# Patient Record
Sex: FEMALE | Race: BLACK OR AFRICAN AMERICAN | Employment: OTHER | URBAN - METROPOLITAN AREA
[De-identification: names, ages, dates, MRNs, and addresses within clinical notes are randomized per-mention and may not be internally consistent; named-entity substitution may affect disease eponyms.]

---

## 2017-04-10 ENCOUNTER — OFFICE VISIT (OUTPATIENT)
Dept: INTERNAL MEDICINE CLINIC | Age: 67
End: 2017-04-10

## 2017-04-10 ENCOUNTER — HOSPITAL ENCOUNTER (OUTPATIENT)
Dept: MAMMOGRAPHY | Age: 67
Discharge: HOME | End: 2017-04-10
Attending: FAMILY MEDICINE
Payer: MEDICARE

## 2017-04-10 VITALS
TEMPERATURE: 95.9 F | SYSTOLIC BLOOD PRESSURE: 123 MMHG | HEART RATE: 63 BPM | RESPIRATION RATE: 16 BRPM | HEIGHT: 67 IN | WEIGHT: 153 LBS | DIASTOLIC BLOOD PRESSURE: 72 MMHG | OXYGEN SATURATION: 100 % | BODY MASS INDEX: 24.01 KG/M2

## 2017-04-10 DIAGNOSIS — I10 ESSENTIAL HYPERTENSION: Primary | ICD-10-CM

## 2017-04-10 DIAGNOSIS — Z12.31 VISIT FOR SCREENING MAMMOGRAM: ICD-10-CM

## 2017-04-10 PROCEDURE — 77067 SCR MAMMO BI INCL CAD: CPT

## 2017-04-10 RX ORDER — VALSARTAN AND HYDROCHLOROTHIAZIDE 80; 12.5 MG/1; MG/1
TABLET, FILM COATED ORAL
Qty: 90 TAB | Refills: 2 | Status: SHIPPED | OUTPATIENT
Start: 2017-04-10 | End: 2017-12-21 | Stop reason: SDUPTHER

## 2017-04-10 NOTE — PROGRESS NOTES
Subjective:     Chief Complaint   Patient presents with    Hypertension        She  is a 74y.o. year old female with h/o HTN who presents today for 6 month follow up on HTN. She reports that she has been living healthy lifestyle, very careful about what she eats, doing exercise regularly. She lost 12 pounds in 6 months. She says she feels great. No chest pain, soa, abdominal pain. Pertinent items are noted in HPI.   Objective:     Vitals:    04/10/17 0742   BP: 123/72   Pulse: 63   Resp: 16   Temp: 95.9 °F (35.5 °C)   TempSrc: Oral   SpO2: 100%   Weight: 153 lb (69.4 kg)   Height: 5' 6.5\" (1.689 m)       Physical Examination: General appearance - alert, well appearing, and in no distress, oriented to person, place, and time and normal appearing weight  Mental status - alert, oriented to person, place, and time, normal mood, behavior, speech, dress, motor activity, and thought processes  Chest - clear to auscultation, no wheezes, rales or rhonchi, symmetric air entry  Heart - normal rate, regular rhythm, normal S1, S2, no murmurs, rubs, clicks or gallops    No Known Allergies   Social History     Social History    Marital status: SINGLE     Spouse name: N/A    Number of children: N/A    Years of education: N/A     Social History Main Topics    Smoking status: Never Smoker    Smokeless tobacco: Never Used    Alcohol use No    Drug use: No    Sexual activity: No     Other Topics Concern    None     Social History Narrative      Family History   Problem Relation Age of Onset    Diabetes Mother     Hypertension Mother     Heart Disease Mother     Cancer Father     Hypertension Father     Diabetes Brother     Hypertension Brother     Diabetes Brother     Hypertension Brother     Hypertension Brother       Past Surgical History:   Procedure Laterality Date    HX HYSTERECTOMY  1980    HX ORTHOPAEDIC  2-2012    right index finger      Past Medical History:   Diagnosis Date    Hypertension     Prediabetes       Current Outpatient Prescriptions   Medication Sig Dispense Refill    valsartan-hydroCHLOROthiazide (DIOVAN-HCT) 80-12.5 mg per tablet TAKE 1 TABLET BY MOUTH DAILY  Indications: hypertension 90 Tab 2    cholecalciferol (VITAMIN D3) 1,000 unit cap Take  by mouth daily.  aspirin 81 mg chewable tablet Take 81 mg by mouth daily.  Biotin 2,500 mcg cap Take  by mouth.  multivitamin (ONE A DAY) tablet Take 1 Tab by mouth daily. Assessment/ Plan:   Aimee Morgan was seen today for hypertension. Diagnoses and all orders for this visit:    Essential hypertension  -   BP well controlled. Continue current med. valsartan-hydroCHLOROthiazide (DIOVAN-HCT) 80-12.5 mg per tablet; TAKE 1 TABLET BY MOUTH DAILY  Indications: hypertension           Medication risks/benefits/costs/interactions/alternatives discussed with patient. Advised patient to call back or return to office if symptoms worsen/change/persist. If patient cannot reach us or should anything more severe/urgent arise he/she should proceed directly to the nearest emergency department. Discussed expected course/resolution/complications of diagnosis in detail with patient. Patient given a written after visit summary which includes her diagnoses, current medications and vitals. Patient expressed understanding with the diagnosis and plan. Follow-up Disposition:  Return in about 4 months (around 8/10/2017) for medicare wellness and fasting lab. Tra Anderson

## 2017-04-10 NOTE — PROGRESS NOTES
1. Have you been to the ER, urgent care clinic since your last visit? Hospitalized since your last visit? No    2. Have you seen or consulted any other health care providers outside of the [address] [place] [address] since your last visit? Include any pap smears or colon screening.  No

## 2017-04-10 NOTE — MR AVS SNAPSHOT
Visit Information Date & Time Provider Department Dept. Phone Encounter #  
 4/10/2017  7:30 AM Musa Forde MD [facility] [phone] [id number] Follow-up Instructions Return in about 4 months (around 8/10/2017) for medicare wellness and fasting lab. Joey Grant Upcoming Health Maintenance Date Due DTaP/Tdap/Td series (1 - Tdap) 1/22/1973 BREAST CANCER SCRN MAMMOGRAM 3/28/2013 INFLUENZA AGE 9 TO ADULT 8/1/2016 GLAUCOMA SCREENING Q2Y 1/22/2017 Pneumococcal 65+ Low/Medium Risk (1 of 2 - PCV13) 1/22/2017 MEDICARE YEARLY EXAM 1/22/2017 COLONOSCOPY 3/20/2017 Allergies as of 4/10/2017  Review Complete On: 4/10/2017 By: [place] No Known Allergies Current Immunizations  Never Reviewed No immunizations on file. Not reviewed this visit You Were Diagnosed With   
  
 Codes Comments Essential hypertension    -  Primary ICD-10-CM: I10 
ICD-9-CM: 401.9 Vitals BP Pulse Temp Resp Height(growth percentile) Weight(growth percentile) 123/72 (BP 1 Location: Left arm, BP Patient Position: Sitting) 63 95.9 °F (35.5 °C) (Oral) 16 5' 6.5\" (1.689 m) 153 lb (69.4 kg) SpO2 BMI OB Status Smoking Status 100% 24.32 kg/m2 Hysterectomy Never Smoker Vitals History BMI and BSA Data Body Mass Index Body Surface Area  
 24.32 kg/m 2 1.8 m 2 Preferred Pharmacy Pharmacy Name Phone [id number] Debbie Rios[address] [phone] Your Updated Medication List  
  
   
This list is accurate as of: 4/10/17  8:05 AM.  Always use your most recent med list.  
  
  
  
  
 aspirin 81 mg chewable tablet Take 81 mg by mouth daily. Biotin 2,500 mcg Cap Take  by mouth.  
  
 multivitamin tablet Commonly known as:  ONE A DAY Take 1 Tab by mouth daily. valsartan-hydroCHLOROthiazide 80-12.5 mg per tablet Commonly known as:  DIOVAN-HCT  
 TAKE 1 TABLET BY MOUTH DAILY  Indications: hypertension VITAMIN D3 1,000 unit Cap Generic drug:  cholecalciferol Take  by mouth daily. Prescriptions Sent to Pharmacy Refills  
 valsartan-hydroCHLOROthiazide (DIOVAN-HCT) 80-12.5 mg per tablet 2 Sig: TAKE 1 TABLET BY MOUTH DAILY  Indications: hypertension Class: Normal  
 Pharmacy: [address] [place] [address] #: [phone] Follow-up Instructions Return in about 4 months (around 8/10/2017) for medicare wellness and fasting lab. Venus Pickens To-Do List   
 04/10/2017 10:30 AM  
(Arrive by 10:15 AM) Appointment with Kingsley MABRY 1 at [address] [place] [address] ([phone]) Shower or bathe using soap and water. Do not use deodorant, powder, perfumes, or lotion the day of your exam.  If your prior mammograms were not performed at [facility] please bring films with you or forward prior images 2 days before your procedure. Check in at registration 15min before your appointment time unless you were instructed to do otherwise. A script is not necessary, but if you have one, please bring it on the day of the mammogram or have it faxed to the department. [facility] [phone] [address] [place], [place] [id number] [facility] 19 THOMAS  [phone] [facility] [phone] [address] [phone] Please arrive 15 minutes prior to appointment to register. Introducing [facility]! Gabriel He introduces [employer] patient portal. Now you can access parts of your medical record, email your doctor's office, and request medication refills online. 1. In your internet browser, go to [email or web address] 2. Click on the First Time User? Click Here link in the Sign In box. You will see the New Member Sign Up page. 3. Enter your [employer] Access Code exactly as it appears below. You will not need to use this code after youve completed the sign-up process.  If you do not sign up before the expiration date, you must request a new code. · [employer] Access Code: [email or web address] Expires: 6/20/2017  3:42 PM 
 
4. Enter the last four digits of your Social Security Number (xxxx) and Date of Birth (mm/dd/yyyy) as indicated and click Submit. You will be taken to the next sign-up page. 5. Create a [employer] ID. This will be your [employer] login ID and cannot be changed, so think of one that is secure and easy to remember. 6. Create a [employer] password. You can change your password at any time. 7. Enter your Password Reset Question and Answer. This can be used at a later time if you forget your password. 8. Enter your e-mail address. You will receive e-mail notification when new information is available in [address] 9. Click Sign Up. You can now view and download portions of your medical record. 10. Click the Download Summary menu link to download a portable copy of your medical information. If you have questions, please visit the Frequently Asked Questions section of the [employer] website. Remember, [employer] is NOT to be used for urgent needs. For medical emergencies, dial 911. Now available from your iPhone and Android! Please provide this summary of care documentation to your next provider. Your primary care clinician is listed as Musa Dahl. If you have any questions after today's visit, please call [phone].

## 2017-10-30 ENCOUNTER — OFFICE VISIT (OUTPATIENT)
Dept: INTERNAL MEDICINE CLINIC | Age: 67
End: 2017-10-30

## 2017-10-30 VITALS
TEMPERATURE: 96.5 F | WEIGHT: 151.2 LBS | HEART RATE: 69 BPM | SYSTOLIC BLOOD PRESSURE: 138 MMHG | HEIGHT: 67 IN | RESPIRATION RATE: 18 BRPM | BODY MASS INDEX: 23.73 KG/M2 | DIASTOLIC BLOOD PRESSURE: 79 MMHG | OXYGEN SATURATION: 100 %

## 2017-10-30 DIAGNOSIS — Z78.0 POST-MENOPAUSAL: Primary | ICD-10-CM

## 2017-10-30 DIAGNOSIS — Z00.00 MEDICARE ANNUAL WELLNESS VISIT, SUBSEQUENT: ICD-10-CM

## 2017-10-30 DIAGNOSIS — I10 ESSENTIAL HYPERTENSION: ICD-10-CM

## 2017-10-30 DIAGNOSIS — Z23 ENCOUNTER FOR IMMUNIZATION: ICD-10-CM

## 2017-10-30 DIAGNOSIS — Z12.11 SCREEN FOR COLON CANCER: ICD-10-CM

## 2017-10-30 RX ORDER — ASCORBIC ACID 250 MG
TABLET ORAL
COMMUNITY

## 2017-10-30 NOTE — LETTER
11/3/2017 11:47 AM 
 
Ms. Heather Boucher [address] [facility] [phone] Dear Heather Boucher: 
 
Please find your most recent results below. Resulted Orders CBC WITH AUTOMATED DIFF Result Value Ref Range WBC 4.4 3.4 - 10.8 x10E3/uL  
 RBC 4.35 3.77 - 5.28 x10E6/uL HGB 12.6 11.1 - 15.9 g/dL HCT 38.3 34.0 - 46.6 % MCV 88 79 - 97 fL  
 MCH 29.0 26.6 - 33.0 pg  
 MCHC 32.9 31.5 - 35.7 g/dL  
 RDW 14.1 12.3 - 15.4 % PLATELET 748 931 - 540 x10E3/uL NEUTROPHILS 40 Not Estab. % Lymphocytes 48 Not Estab. % MONOCYTES 9 Not Estab. % EOSINOPHILS 2 Not Estab. % BASOPHILS 1 Not Estab. %  
 ABS. NEUTROPHILS 1.8 1.4 - 7.0 x10E3/uL Abs Lymphocytes 2.1 0.7 - 3.1 x10E3/uL  
 ABS. MONOCYTES 0.4 0.1 - 0.9 x10E3/uL  
 ABS. EOSINOPHILS 0.1 0.0 - 0.4 x10E3/uL  
 ABS. BASOPHILS 0.1 0.0 - 0.2 x10E3/uL IMMATURE GRANULOCYTES 0 Not Estab. %  
 ABS. IMM. GRANS. 0.0 0.0 - 0.1 x10E3/uL Narrative Performed at:  [address]  [id number] : Noel Swift MD, Phone:  [phone] METABOLIC PANEL, COMPREHENSIVE Result Value Ref Range Glucose 80 65 - 99 mg/dL BUN 18 8 - 27 mg/dL Creatinine 0.76 0.57 - 1.00 mg/dL GFR est non-AA 83 >59 mL/min/1.73 GFR est AA 95 >59 mL/min/1.73  
 BUN/Creatinine ratio 24 12 - 28 Sodium 138 134 - 144 mmol/L Potassium 4.2 3.5 - 5.2 mmol/L Chloride 99 96 - 106 mmol/L  
 CO2 25 18 - 29 mmol/L Calcium 9.3 8.7 - 10.3 mg/dL Protein, total 6.6 6.0 - 8.5 g/dL Albumin 4.0 3.6 - 4.8 g/dL GLOBULIN, TOTAL 2.6 1.5 - 4.5 g/dL A-G Ratio 1.5 1.2 - 2.2 Bilirubin, total 0.5 0.0 - 1.2 mg/dL Alk. phosphatase 41 39 - 117 IU/L  
 AST (SGOT) 14 0 - 40 IU/L  
 ALT (SGPT) 13 0 - 32 IU/L Narrative Performed at:  [address]  [id number] : Noel Swift MD, Phone:  [phone] LIPID PANEL Result Value Ref Range Cholesterol, total 191 100 - 199 mg/dL Triglyceride 66 0 - 149 mg/dL HDL Cholesterol 65 >39 mg/dL VLDL, calculated 13 5 - 40 mg/dL LDL, calculated 113 (H) 0 - 99 mg/dL Narrative Performed at:  [address]  [id number] : Kvng Mendes MD, Phone:  [phone] HEMOGLOBIN A1C WITH EAG Result Value Ref Range Hemoglobin A1c 5.6 4.8 - 5.6 % Comment:  
            Pre-diabetes: 5.7 - 6.4 Diabetes: >6.4 Glycemic control for adults with diabetes: <7.0 Estimated average glucose 114 mg/dL Narrative Performed at:  [address]  [id number] : Kvng Mendes MD, Phone:  [phone] HEPATITIS C AB Result Value Ref Range Hep C Virus Ab <0.1 0.0 - 0.9 s/co ratio Comment:  
                                     Negative:     < 0.8 Indeterminate: 0.8 - 0.9 Positive:     > 0.9 The CDC recommends that a positive HCV antibody result 
 be followed up with a HCV Nucleic Acid Amplification 
 test ([id number]). Narrative Performed at:  [address]  [id number] : Kvng Mendes MD, Phone:  [phone] CVD REPORT Result Value Ref Range INTERPRETATION Note Comment:  
   Supplemental report is available. Narrative Performed at:  [address] [place] [address]  [id number] : Lily Clinton PhD, Phone:  [phone] RECOMMENDATIONS: 
 
CBC,kidney, liver function is normal.  
 
Cholesterol level is slightly elevated, not concerning. No diabetes. Hep C is negative Please call me if you have any questions: [phone] Sincerely, 
 
 
Sage Gonzalez MD

## 2017-10-30 NOTE — MR AVS SNAPSHOT
Visit Information Date & Time Provider Department Dept. Phone Encounter #  
 10/30/2017  7:45 AM Kuldeep Grullon MD [facility] Internal Medicine [phone] [id number] Follow-up Instructions Return in about 6 months (around 4/30/2018). Upcoming Health Maintenance Date Due DTaP/Tdap/Td series (1 - Tdap) 1/22/1973 GLAUCOMA SCREENING Q2Y 1/22/2017 Pneumococcal 65+ Low/Medium Risk (1 of 2 - PCV13) 1/22/2017 MEDICARE YEARLY EXAM 1/22/2017 FOBT Q 1 YEAR, 18+ 8/30/2017 BREAST CANCER SCRN MAMMOGRAM 4/10/2018 COLONOSCOPY 10/30/2027 Allergies as of 10/30/2017  Review Complete On: 10/30/2017 By: Cleo Glaser MD  
 No Known Allergies Current Immunizations  Never Reviewed Name Date Pneumococcal Conjugate (PCV-13)  Incomplete Not reviewed this visit You Were Diagnosed With   
  
 Codes Comments Post-menopausal    -  Primary ICD-10-CM: Z78.0 ICD-9-CM: V49.81 Medicare annual wellness visit, subsequent     ICD-10-CM: Z00.00 ICD-9-CM: V70.0 Screen for colon cancer     ICD-10-CM: Z12.11 ICD-9-CM: V76.51 Encounter for immunization     ICD-10-CM: I89 ICD-9-CM: V03.89 Vitals BP Pulse Temp Resp Height(growth percentile) Weight(growth percentile) 138/79 (BP 1 Location: Left arm, BP Patient Position: Sitting) 69 96.5 °F (35.8 °C) (Oral) 18 5' 6.5\" (1.689 m) 151 lb 3.2 oz (68.6 kg) SpO2 BMI OB Status Smoking Status 100% 24.04 kg/m2 Hysterectomy Never Smoker Vitals History BMI and BSA Data Body Mass Index Body Surface Area 24.04 kg/m 2 1.79 m 2 Preferred Pharmacy Pharmacy Name Phone [id number] Jovana Boogie, [address] [place] [address] [phone] Your Updated Medication List  
  
   
This list is accurate as of: 10/30/17  8:21 AM.  Always use your most recent med list.  
  
  
  
  
 aspirin 81 mg chewable tablet Take 81 mg by mouth daily. Biotin 2,500 mcg Cap Take  by mouth.  
  
 multivitamin tablet Commonly known as:  ONE A DAY Take 1 Tab by mouth daily. valsartan-hydroCHLOROthiazide 80-12.5 mg per tablet Commonly known as:  DIOVAN-HCT  
TAKE 1 TABLET BY MOUTH DAILY  Indications: hypertension VITAMIN C 250 mg tablet Generic drug:  ascorbic acid (vitamin C) Take  by mouth. VITAMIN D3 1,000 unit Cap Generic drug:  cholecalciferol Take  by mouth daily. We Performed the Following ADMIN PNEUMOCOCCAL VACCINE [ [facility]] CBC WITH AUTOMATED DIFF [52952 CPT(R)] HEMOGLOBIN A1C WITH EAG [91316 CPT(R)] HEPATITIS C AB [69233 CPT(R)] LIPID PANEL [06428 CPT(R)] METABOLIC PANEL, COMPREHENSIVE [19882 CPT(R)] OCCULT BLOOD, IMMUNOASSAY (FIT) [id number] CPT(R)] PNEUMOCOCCAL CONJ VACCINE 13 VALENT IM [id number] CPT(R)] Follow-up Instructions Return in about 6 months (around 4/30/2018). To-Do List   
 11/02/2017 Imaging:  DEXA BONE DENSITY STUDY AXIAL Patient Instructions Medicare Wellness Visit, Female The best way to live healthy is to have a healthy lifestyle by eating a well-balanced diet, exercising regularly, limiting alcohol and stopping smoking. Regular physical exams and screening tests are another way to keep healthy. Preventive exams provided by your health care provider can find health problems before they become diseases or illnesses. Preventive services including immunizations, screening tests, monitoring and exams can help you take care of your own health. All people over age 72 should have a pneumovax  and and a prevnar shot to prevent pneumonia. These are once in a lifetime unless you and your provider decide differently. All people over 65 should have a yearly flu shot and a tetanus vaccine every 10 years. A bone mass density to screen for osteoporosis or thinning of the bones should be done every 2 years after 65. Screening for diabetes mellitus with a blood sugar test should be done every year. Glaucoma is a disease of the eye due to increased ocular pressure that can lead to blindness and it should be done every year by an eye professional. 
 
Cardiovascular screening tests that check for elevated lipids (fatty part of blood) which can lead to heart disease and strokes should be done every 5 years. Colorectal screening that evaluates for blood or polyps in your colon should be done yearly as a stool test or every five years as a flexible sigmoidoscope or every 10 years as a colonoscopy up to age 76. Breast cancer screening with a mammogram is recommended biennially  for women age 54-69. Screening for cervical cancer with a pap smear and pelvic exam is recommended for women after age 72 years every 2 years up to age 79 or when the provider and patient decide to stop. If there is a history of cervical abnormalities or other increased risk for cancer then the test is recommended yearly. Hepatitis C screening is also recommended for anyone born between 80 through [address]. A shingles vaccine is also recommended once in a lifetime after age 61. Your Medicare Wellness Exam is recommended annually. Here is a list of your current Health Maintenance items with a due date: 
Health Maintenance Due Topic Date Due  
 DTaP/Tdap/Td  (1 - Tdap) 01/22/1973  Glaucoma Screening   01/22/2017  Pneumococcal Vaccine (1 of 2 - PCV13) 01/22/2017 [address] [place] [address] Annual Well Visit  01/22/2017  Stool testing for trace blood  08/30/2017 Well Visit, Over 72: Care Instructions Your Care Instructions Physical exams can help you stay healthy. Your doctor has checked your overall health and may have suggested ways to take good care of yourself. He or she also may have recommended tests. At home, you can help prevent illness with healthy eating, regular exercise, and other steps. Follow-up care is a key part of your treatment and safety. Be sure to make and go to all appointments, and call your doctor if you are having problems. It's also a good idea to know your test results and keep a list of the medicines you take. How can you care for yourself at home? · Reach and stay at a healthy weight. This will lower your risk for many problems, such as obesity, diabetes, heart disease, and high blood pressure. · Get at least 30 minutes of exercise on most days of the week. Walking is a good choice. You also may want to do other activities, such as running, swimming, cycling, or playing tennis or team sports. · Do not smoke. Smoking can make health problems worse. If you need help quitting, talk to your doctor about stop-smoking programs and medicines. These can increase your chances of quitting for good. · Protect your skin from too much sun. When you're outdoors from 10 a.m. to 4 p.m., stay in the shade or cover up with clothing and a hat with a wide brim. Wear sunglasses that block UV rays. Even when it's cloudy, put broad-spectrum sunscreen (SPF 30 or higher) on any exposed skin. · See a dentist one or two times a year for checkups and to have your teeth cleaned. · Wear a seat belt in the car. · Limit alcohol to 2 drinks a day for men and 1 drink a day for women. Too much alcohol can cause health problems. Follow your doctor's advice about when to have certain tests. These tests can spot problems early. For men and women · Cholesterol. Your doctor will tell you how often to have this done based on your overall health and other things that can increase your risk for heart attack and stroke. · Blood pressure. Have your blood pressure checked during a routine doctor visit. Your doctor will tell you how often to check your blood pressure based on your age, your blood pressure results, and other factors. · Diabetes. Ask your doctor whether you should have tests for diabetes. · Vision. Experts recommend that you have yearly exams for glaucoma and other age-related eye problems. · Hearing. Tell your doctor if you notice any change in your hearing. You can have tests to find out how well you hear. · Colon cancer tests. Keep having colon cancer tests as your doctor recommends. You can have one of several types of tests. · Heart attack and stroke risk. At least every 4 to 6 years, you should have your risk for heart attack and stroke assessed. Your doctor uses factors such as your age, blood pressure, cholesterol, and whether you smoke or have diabetes to show what your risk for a heart attack or stroke is over the next 10 years. · Osteoporosis. Talk to your doctor about whether you should have a bone density test to find out whether you have thinning bones. Also ask your doctor about whether you should take calcium and vitamin D supplements. For women · Pap test and pelvic exam. You may no longer need a Pap test. Talk with your doctor about whether to stop or continue to have Pap tests. · Breast exam and mammogram. Ask how often you should have a mammogram, which is an X-ray of your breasts. A mammogram can spot breast cancer before it can be felt and when it is easiest to treat. · Thyroid disease. Talk to your doctor about whether to have your thyroid checked as part of a regular physical exam. Women have an increased chance of a thyroid problem. For men · Prostate exam. Talk to your doctor about whether you should have a blood test (called a PSA test) for prostate cancer. Experts disagree on whether men should have this test. Some experts recommend that you discuss the benefits and risks of the test with your doctor. · Abdominal aortic aneurysm. Ask your doctor whether you should have a test to check for an aneurysm. You may need a test if you ever smoked or if your parent, brother, sister, or child has had an aneurysm. When should you call for help? Watch closely for changes in your health, and be sure to contact your doctor if you have any problems or symptoms that concern you. Where can you learn more? Go to http://leonie-kiko.info/. Enter N090 in the search box to learn more about \"Well Visit, Over 65: Care Instructions. \" Current as of: May 12, 2017 Content Version: 11.4 © 5350-4910 Club [employer]cm. Care instructions adapted under license by [employer] (which disclaims liability or warranty for this information). If you have questions about a medical condition or this instruction, always ask your healthcare professional. [place] [address] any warranty or liability for your use of this information. Introducing [facility]! Pilar Lo introduces Syrmo patient portal. Now you can access parts of your medical record, email your doctor's office, and request medication refills online. 1. In your internet browser, go to [email or web address] 2. Click on the First Time User? Click Here link in the Sign In box. You will see the New Member Sign Up page. 3. Enter your Syrmo Access Code exactly as it appears below. You will not need to use this code after youve completed the sign-up process. If you do not sign up before the expiration date, you must request a new code. · Syrmo Access Code: [email or web address] Expires: 1/28/2018  8:21 AM 
 
4. Enter the last four digits of your Social Security Number (xxxx) and Date of Birth (mm/dd/yyyy) as indicated and click Submit. You will be taken to the next sign-up page. 5. Create a Seeliot ID. This will be your Syrmo login ID and cannot be changed, so think of one that is secure and easy to remember. 6. Create a Syrmo password. You can change your password at any time. 7. Enter your Password Reset Question and Answer. This can be used at a later time if you forget your password. 8. Enter your e-mail address. You will receive e-mail notification when new information is available in [address] 9. Click Sign Up. You can now view and download portions of your medical record. 10. Click the Download Summary menu link to download a portable copy of your medical information. If you have questions, please visit the Frequently Asked Questions section of the Trapster website. Remember, Trapster is NOT to be used for urgent needs. For medical emergencies, dial 911. Now available from your iPhone and Android! Please provide this summary of care documentation to your next provider. Your primary care clinician is listed as Musa A Hesham. If you have any questions after today's visit, please call [phone].

## 2017-10-30 NOTE — LETTER
11/3/2017 11:37 AM 
 
Ms. Ran Noel [address] [facility] [phone] Dear Ran Noel: 
 
Please find your most recent results below. Resulted Orders OCCULT BLOOD, IMMUNOASSAY (FIT) Result Value Ref Range Occult blood fecal, by [place] CANCELED Comment:  
   Result canceled by the ancillary Narrative Performed at:  [address]  [id number] : Jyoti Anthony MD, Phone:  [phone] SPECIMEN STATUS REPORT Result Value Ref Range SPECIMEN STATUS REPORT COMMENT Comment:  
   No Test Indicated A lavender top tube was received with no test indicated A serum separator tube was received with no test indicated Dear Doctor, The requisition we received for the above patient has no test 
indicated on the request form for one or more of the specimens 
submitted. The [place] Code of [employer] requires a 
written and signed request be forwarded to the testing laboratory 
following the verbal order of a laboratory test. 
Date:_________________________________ ICD-9/10 Diagnosis Code(s):___________________________________________ Physician or Authorized Designee Signature: 
______________________________________________________________________ Your signature confirms your order of the test(s) listed Required test name(s):________________________________________________ Required test number(s):______________________________________________ Please provide requested information and fax to [phone] 
to expedite testing. Narrative Performed at:  [address]  [id number] : Jyoti Anthony MD, Phone:  [phone] CBC WITH AUTOMATED DIFF Result Value Ref Range WBC 4.4 3.4 - 10.8 x10E3/uL  
 RBC 4.35 3.77 - 5.28 x10E6/uL HGB 12.6 11.1 - 15.9 g/dL HCT 38.3 34.0 - 46.6 %  MCV 88 79 - 97 fL  
 MCH 29.0 26.6 - 33.0 pg  
 MCHC 32.9 31.5 - 35.7 g/dL  
 RDW 14.1 12.3 - 15.4 % PLATELET 887 604 - 213 x10E3/uL NEUTROPHILS 40 Not Estab. % Lymphocytes 48 Not Estab. % MONOCYTES 9 Not Estab. % EOSINOPHILS 2 Not Estab. % BASOPHILS 1 Not Estab. %  
 ABS. NEUTROPHILS 1.8 1.4 - 7.0 x10E3/uL Abs Lymphocytes 2.1 0.7 - 3.1 x10E3/uL  
 ABS. MONOCYTES 0.4 0.1 - 0.9 x10E3/uL  
 ABS. EOSINOPHILS 0.1 0.0 - 0.4 x10E3/uL  
 ABS. BASOPHILS 0.1 0.0 - 0.2 x10E3/uL IMMATURE GRANULOCYTES 0 Not Estab. %  
 ABS. IMM. GRANS. 0.0 0.0 - 0.1 x10E3/uL Narrative Performed at:  [address]  [id number] : Tip Johnson MD, Phone:  [phone] METABOLIC PANEL, COMPREHENSIVE Result Value Ref Range Glucose 80 65 - 99 mg/dL BUN 18 8 - 27 mg/dL Creatinine 0.76 0.57 - 1.00 mg/dL GFR est non-AA 83 >59 mL/min/1.73 GFR est AA 95 >59 mL/min/1.73  
 BUN/Creatinine ratio 24 12 - 28 Sodium 138 134 - 144 mmol/L Potassium 4.2 3.5 - 5.2 mmol/L Chloride 99 96 - 106 mmol/L  
 CO2 25 18 - 29 mmol/L Calcium 9.3 8.7 - 10.3 mg/dL Protein, total 6.6 6.0 - 8.5 g/dL Albumin 4.0 3.6 - 4.8 g/dL GLOBULIN, TOTAL 2.6 1.5 - 4.5 g/dL A-G Ratio 1.5 1.2 - 2.2 Bilirubin, total 0.5 0.0 - 1.2 mg/dL Alk. phosphatase 41 39 - 117 IU/L  
 AST (SGOT) 14 0 - 40 IU/L  
 ALT (SGPT) 13 0 - 32 IU/L Narrative Performed at:  [address]  [id number] : Tip Johnson MD, Phone:  [phone] LIPID PANEL Result Value Ref Range Cholesterol, total 191 100 - 199 mg/dL Triglyceride 66 0 - 149 mg/dL HDL Cholesterol 65 >39 mg/dL VLDL, calculated 13 5 - 40 mg/dL LDL, calculated 113 (H) 0 - 99 mg/dL Narrative Performed at:  [address]  [id number] : Tip Johnson MD, Phone:  [phone] HEMOGLOBIN A1C WITH EAG Result Value Ref Range Hemoglobin A1c 5.6 4.8 - 5.6 % Comment:  
            Pre-diabetes: 5.7 - 6.4 Diabetes: >6.4 Glycemic control for adults with diabetes: <7.0 Estimated average glucose 114 mg/dL Narrative Performed at:  [address]  [id number] : Kika Ballesteros MD, Phone:  [phone] HEPATITIS C AB Result Value Ref Range Hep C Virus Ab <0.1 0.0 - 0.9 s/co ratio Comment:  
                                     Negative:     < 0.8 Indeterminate: 0.8 - 0.9 Positive:     > 0.9 The CDC recommends that a positive HCV antibody result 
 be followed up with a HCV Nucleic Acid Amplification 
 test (308176). Narrative Performed at:  [address]  [id number] : Kika Ballesteros MD, Phone:  [phone] CVD REPORT Result Value Ref Range INTERPRETATION Note Comment:  
   Supplemental report is available. Narrative Performed at:  [address] [place] [address]  [id number] : Umberto Jolly PhD, Phone:  [phone] RECOMMENDATIONS: 
 
CBC,kidney, liver function is normal.  
 
Cholesterol level is slightly elevated, not concerning. No diabetes. Hep C is negative. Please call me if you have any questions: [phone] Sincerely, 
 
 
Cleo Glaser MD

## 2017-10-30 NOTE — PROGRESS NOTES
This is a Subsequent Medicare Annual Wellness Exam (AWV) (Performed 12 months after IPPE or effective date of Medicare Part B enrollment)    I have reviewed the patient's medical history in detail and updated the computerized patient record. BP has been well controlled with Diovan. Active, participates in [employer] regularly. Last A1c was 6.2. Denies any chest pain, cough, abdominal pain, diarrhea, bloody stool. History     Past Medical History:   Diagnosis Date    Hypertension     Prediabetes       Past Surgical History:   Procedure Laterality Date    HX HYSTERECTOMY  38    HX ORTHOPAEDIC  2-2012    right index finger     Current Outpatient Prescriptions   Medication Sig Dispense Refill    ascorbic acid, vitamin C, (VITAMIN C) 250 mg tablet Take  by mouth.  valsartan-hydroCHLOROthiazide (DIOVAN-HCT) 80-12.5 mg per tablet TAKE 1 TABLET BY MOUTH DAILY  Indications: hypertension 90 Tab 2    cholecalciferol (VITAMIN D3) 1,000 unit cap Take  by mouth daily.  aspirin 81 mg chewable tablet Take 81 mg by mouth daily.  Biotin 2,500 mcg cap Take  by mouth.  multivitamin (ONE A DAY) tablet Take 1 Tab by mouth daily.        No Known Allergies  Family History   Problem Relation Age of Onset    Diabetes Mother     Hypertension Mother     Heart Disease Mother     Cancer Father     Hypertension Father     Diabetes Brother     Hypertension Brother     Diabetes Brother     Hypertension Brother     Hypertension Brother      Social History   Substance Use Topics    Smoking status: Never Smoker    Smokeless tobacco: Never Used    Alcohol use No     Patient Active Problem List   Diagnosis Code    Essential hypertension I10    Post-menopausal Z78.0    Prediabetes R73.03    Presbyopia H52.4       Depression Risk Factor Screening:     PHQ over the last two weeks 10/30/2017   Little interest or pleasure in doing things Not at all   Feeling down, depressed or hopeless Not at all Total Score PHQ 2 0     Alcohol Risk Factor Screening: You do not drink alcohol or very rarely. Functional Ability and Level of Safety:   Hearing Loss  Hearing is good. Activities of Daily Living  The home contains: handrails, grab bars and rugs  Patient does total self care    Fall RiskFall Risk Assessment, last 12 mths 10/30/2017   Able to walk? Yes   Fall in past 12 months? No       Abuse Screen  Patient is not abused  lives with son. Cognitive Screening   Evaluation of Cognitive Function:  Has your family/caregiver stated any concerns about your memory: no  Normal     Physical Examination: General appearance - alert, well appearing, and in no distress, oriented to person, place, and time and overweight  Mental status - alert, oriented to person, place, and time, normal mood, behavior, speech, dress, motor activity, and thought processes  Chest - clear to auscultation, no wheezes, rales or rhonchi, symmetric air entry  Heart - normal rate, regular rhythm, normal S1, S2, no murmurs, rubs, clicks or gallops  Abd: S/NT/ND  Neurological - alert, oriented, normal speech, no focal findings or movement disorder noted        Patient Care Team   Patient Care Team:  Soren Erickson MD as PCP - General (Family Practice)    Assessment/Plan   Education and counseling provided:  Are appropriate based on today's review and evaluation  Pneumococcal Vaccine  Influenza Vaccine  Screening Mammography  Colorectal cancer screening tests  Bone mass measurement (DEXA)  Screening for glaucoma    Diagnoses and all orders for this visit:    1. Post-menopausal  -     Dexa Bone Density Study Axial ([id number]); Future    2. Medicare annual wellness visit, subsequent  -     OCCULT BLOOD, IMMUNOASSAY (FIT) (28119)  -     HEPATITIS C AB  -     Dexa Bone Density Study Axial ([id number]);  Future  -     Pneumococcal Admin ()  -     Pneumococcal Conjugate Vaccine  -     CBC WITH AUTOMATED DIFF  -     METABOLIC PANEL, COMPREHENSIVE  - LIPID PANEL  -     HEMOGLOBIN A1C WITH EAG    3. Screen for colon cancer  -     OCCULT BLOOD, IMMUNOASSAY (FIT) ([id number])    4. Encounter for immunization  -     Pneumococcal Admin ()  -     Pneumococcal Conjugate Vaccine    5. Essential hypertension        -     BP well controlled. Continue Diovan.       Health Maintenance Due   Topic Date Due    GLAUCOMA SCREENING Q2Y  01/22/2017    FOBT Q 1 YEAR, 18+  08/30/2017

## 2017-10-30 NOTE — PATIENT INSTRUCTIONS
Medicare Wellness Visit, Female    The best way to live healthy is to have a healthy lifestyle by eating a well-balanced diet, exercising regularly, limiting alcohol and stopping smoking. Regular physical exams and screening tests are another way to keep healthy. Preventive exams provided by your health care provider can find health problems before they become diseases or illnesses. Preventive services including immunizations, screening tests, monitoring and exams can help you take care of your own health. All people over age 72 should have a pneumovax  and and a prevnar shot to prevent pneumonia. These are once in a lifetime unless you and your provider decide differently. All people over 65 should have a yearly flu shot and a tetanus vaccine every 10 years. A bone mass density to screen for osteoporosis or thinning of the bones should be done every 2 years after 65. Screening for diabetes mellitus with a blood sugar test should be done every year. Glaucoma is a disease of the eye due to increased ocular pressure that can lead to blindness and it should be done every year by an eye professional.    Cardiovascular screening tests that check for elevated lipids (fatty part of blood) which can lead to heart disease and strokes should be done every 5 years. Colorectal screening that evaluates for blood or polyps in your colon should be done yearly as a stool test or every five years as a flexible sigmoidoscope or every 10 years as a colonoscopy up to age 76. Breast cancer screening with a mammogram is recommended biennially  for women age 54-69. Screening for cervical cancer with a pap smear and pelvic exam is recommended for women after age 72 years every 2 years up to age 79 or when the provider and patient decide to stop. If there is a history of cervical abnormalities or other increased risk for cancer then the test is recommended yearly.     Hepatitis C screening is also recommended for anyone born between [birth year] [facility]. A shingles vaccine is also recommended once in a lifetime after age 61. Your Medicare Wellness Exam is recommended annually. Here is a list of your current Health Maintenance items with a due date:  Health Maintenance Due   Topic Date Due    DTaP/Tdap/Td  (1 - Tdap) 01/22/1973    Glaucoma Screening   01/22/2017    Pneumococcal Vaccine (1 of 2 - PCV13) 01/22/2017    Annual Well Visit  01/22/2017    Stool testing for trace blood  08/30/2017          Well Visit, Over 72: Care Instructions  Your Care Instructions    Physical exams can help you stay healthy. Your doctor has checked your overall health and may have suggested ways to take good care of yourself. He or she also may have recommended tests. At home, you can help prevent illness with healthy eating, regular exercise, and other steps. Follow-up care is a key part of your treatment and safety. Be sure to make and go to all appointments, and call your doctor if you are having problems. It's also a good idea to know your test results and keep a list of the medicines you take. How can you care for yourself at home? · Reach and stay at a healthy weight. This will lower your risk for many problems, such as obesity, diabetes, heart disease, and high blood pressure. · Get at least 30 minutes of exercise on most days of the week. Walking is a good choice. You also may want to do other activities, such as running, swimming, cycling, or playing tennis or team sports. · Do not smoke. Smoking can make health problems worse. If you need help quitting, talk to your doctor about stop-smoking programs and medicines. These can increase your chances of quitting for good. · Protect your skin from too much sun. When you're outdoors from 10 a.m. to 4 p.m., stay in the shade or cover up with clothing and a hat with a wide brim. Wear sunglasses that block UV rays.  Even when it's cloudy, put broad-spectrum sunscreen (SPF 30 or higher) on any exposed skin. · See a dentist one or two times a year for checkups and to have your teeth cleaned. · Wear a seat belt in the car. · Limit alcohol to 2 drinks a day for men and 1 drink a day for women. Too much alcohol can cause health problems. Follow your doctor's advice about when to have certain tests. These tests can spot problems early. For men and women  · Cholesterol. Your doctor will tell you how often to have this done based on your overall health and other things that can increase your risk for heart attack and stroke. · Blood pressure. Have your blood pressure checked during a routine doctor visit. Your doctor will tell you how often to check your blood pressure based on your age, your blood pressure results, and other factors. · Diabetes. Ask your doctor whether you should have tests for diabetes. · Vision. Experts recommend that you have yearly exams for glaucoma and other age-related eye problems. · Hearing. Tell your doctor if you notice any change in your hearing. You can have tests to find out how well you hear. · Colon cancer tests. Keep having colon cancer tests as your doctor recommends. You can have one of several types of tests. · Heart attack and stroke risk. At least every 4 to 6 years, you should have your risk for heart attack and stroke assessed. Your doctor uses factors such as your age, blood pressure, cholesterol, and whether you smoke or have diabetes to show what your risk for a heart attack or stroke is over the next 10 years. · Osteoporosis. Talk to your doctor about whether you should have a bone density test to find out whether you have thinning bones. Also ask your doctor about whether you should take calcium and vitamin D supplements. For women  · Pap test and pelvic exam. You may no longer need a Pap test. Talk with your doctor about whether to stop or continue to have Pap tests.   · Breast exam and mammogram. Ask how often you should have a mammogram, which is an X-ray of your breasts. A mammogram can spot breast cancer before it can be felt and when it is easiest to treat. · Thyroid disease. Talk to your doctor about whether to have your thyroid checked as part of a regular physical exam. Women have an increased chance of a thyroid problem. For men  · Prostate exam. Talk to your doctor about whether you should have a blood test (called a PSA test) for prostate cancer. Experts disagree on whether men should have this test. Some experts recommend that you discuss the benefits and risks of the test with your doctor. · Abdominal aortic aneurysm. Ask your doctor whether you should have a test to check for an aneurysm. You may need a test if you ever smoked or if your parent, brother, sister, or child has had an aneurysm. When should you call for help? Watch closely for changes in your health, and be sure to contact your doctor if you have any problems or symptoms that concern you. Where can you learn more? Go to http://leonie-kiko.info/. Enter A867 in the search box to learn more about \"Well Visit, Over 65: Care Instructions. \"  Current as of: May 12, 2017  Content Version: 11.4  © 0637-7443 Healthwise, [employer]. Care instructions adapted under license by [employer] (which disclaims liability or warranty for this information). If you have questions about a medical condition or this instruction, always ask your healthcare professional. [place] [address] any warranty or liability for your use of this information.

## 2017-11-01 LAB — SPECIMEN STATUS REPORT, ROLRST: NORMAL

## 2017-11-02 LAB
ALBUMIN SERPL-MCNC: 4 G/DL (ref 3.6–4.8)
ALBUMIN/GLOB SERPL: 1.5 {RATIO} (ref 1.2–2.2)
ALP SERPL-CCNC: 41 IU/L (ref 39–117)
ALT SERPL-CCNC: 13 IU/L (ref 0–32)
AST SERPL-CCNC: 14 IU/L (ref 0–40)
BASOPHILS # BLD AUTO: 0.1 X10E3/UL (ref 0–0.2)
BASOPHILS NFR BLD AUTO: 1 %
BILIRUB SERPL-MCNC: 0.5 MG/DL (ref 0–1.2)
BUN SERPL-MCNC: 18 MG/DL (ref 8–27)
BUN/CREAT SERPL: 24 (ref 12–28)
CALCIUM SERPL-MCNC: 9.3 MG/DL (ref 8.7–10.3)
CHLORIDE SERPL-SCNC: 99 MMOL/L (ref 96–106)
CHOLEST SERPL-MCNC: 191 MG/DL (ref 100–199)
CO2 SERPL-SCNC: 25 MMOL/L (ref 18–29)
CREAT SERPL-MCNC: 0.76 MG/DL (ref 0.57–1)
EOSINOPHIL # BLD AUTO: 0.1 X10E3/UL (ref 0–0.4)
EOSINOPHIL NFR BLD AUTO: 2 %
ERYTHROCYTE [DISTWIDTH] IN BLOOD BY AUTOMATED COUNT: 14.1 % (ref 12.3–15.4)
EST. AVERAGE GLUCOSE BLD GHB EST-MCNC: 114 MG/DL
GFR SERPLBLD CREATININE-BSD FMLA CKD-EPI: 83 ML/MIN/1.73
GFR SERPLBLD CREATININE-BSD FMLA CKD-EPI: 95 ML/MIN/1.73
GLOBULIN SER CALC-MCNC: 2.6 G/DL (ref 1.5–4.5)
GLUCOSE SERPL-MCNC: 80 MG/DL (ref 65–99)
HBA1C MFR BLD: 5.6 % (ref 4.8–5.6)
HCT VFR BLD AUTO: 38.3 % (ref 34–46.6)
HCV AB S/CO SERPL IA: <0.1 S/CO RATIO (ref 0–0.9)
HDLC SERPL-MCNC: 65 MG/DL
HEMOCCULT STL QL IA: NORMAL
HGB BLD-MCNC: 12.6 G/DL (ref 11.1–15.9)
IMM GRANULOCYTES # BLD: 0 X10E3/UL (ref 0–0.1)
IMM GRANULOCYTES NFR BLD: 0 %
INTERPRETATION, 910389: NORMAL
LDLC SERPL CALC-MCNC: 113 MG/DL (ref 0–99)
LYMPHOCYTES # BLD AUTO: 2.1 X10E3/UL (ref 0.7–3.1)
LYMPHOCYTES NFR BLD AUTO: 48 %
MCH RBC QN AUTO: 29 PG (ref 26.6–33)
MCHC RBC AUTO-ENTMCNC: 32.9 G/DL (ref 31.5–35.7)
MCV RBC AUTO: 88 FL (ref 79–97)
MONOCYTES # BLD AUTO: 0.4 X10E3/UL (ref 0.1–0.9)
MONOCYTES NFR BLD AUTO: 9 %
NEUTROPHILS # BLD AUTO: 1.8 X10E3/UL (ref 1.4–7)
NEUTROPHILS NFR BLD AUTO: 40 %
PLATELET # BLD AUTO: 218 X10E3/UL (ref 150–379)
POTASSIUM SERPL-SCNC: 4.2 MMOL/L (ref 3.5–5.2)
PROT SERPL-MCNC: 6.6 G/DL (ref 6–8.5)
RBC # BLD AUTO: 4.35 X10E6/UL (ref 3.77–5.28)
SODIUM SERPL-SCNC: 138 MMOL/L (ref 134–144)
SPECIMEN STATUS REPORT, ROLRST: NORMAL
TRIGL SERPL-MCNC: 66 MG/DL (ref 0–149)
VLDLC SERPL CALC-MCNC: 13 MG/DL (ref 5–40)
WBC # BLD AUTO: 4.4 X10E3/UL (ref 3.4–10.8)

## 2017-11-03 ENCOUNTER — TELEPHONE (OUTPATIENT)
Dept: INTERNAL MEDICINE CLINIC | Age: 67
End: 2017-11-03

## 2017-11-03 NOTE — TELEPHONE ENCOUNTER
----- Message from Perfecto Jain MD sent at 11/3/2017 11:33 AM EDT -----  Please call her:    CBC,kidney, liver function is normal.    Cholesterol level is slightly elevated, not concerning. No diabetes. Hep C is negative.

## 2017-11-03 NOTE — PROGRESS NOTES
Please call her:    CBC,kidney, liver function is normal.    Cholesterol level is slightly elevated, not concerning. No diabetes. Hep C is negative.

## 2017-11-20 ENCOUNTER — HOSPITAL ENCOUNTER (OUTPATIENT)
Dept: BONE DENSITY | Age: 67
Discharge: HOME | End: 2017-11-20
Attending: FAMILY MEDICINE
Payer: MEDICARE

## 2017-11-20 DIAGNOSIS — Z78.0 POST-MENOPAUSAL: ICD-10-CM

## 2017-11-20 DIAGNOSIS — Z00.00 MEDICARE ANNUAL WELLNESS VISIT, SUBSEQUENT: ICD-10-CM

## 2017-11-20 PROCEDURE — 77080 DXA BONE DENSITY AXIAL: CPT

## 2017-11-20 NOTE — LETTER
11/21/2017 10:58 AM 
 
Ms. Freedom Parra [address] [place] [id number] Dear Freedom Parra: 
 
Please find your most recent results below. Resulted Orders DEXA BONE DENSITY STUDY AXIAL Narrative Bone Mineral Density Indication:  Screening Age: 74 Sex: Female. Menopause status: postmenopausal. 
Hormone replacement therapy: None Number of falls in the past year:   None. Risk factors for osteoporosis:  None. Current medication for osteoporosis: Calcium and vitamin D. Comparison: None. Technique: Imaging was performed on the ACTON. World Health Organization 
meta-analysis fracture risk calculator (FRAX) analysis was performed for 10 year 
fracture risk probability assessment Excluded sites: 0 Findings: 
  
 
Femoral Neck:  Left Bone mineral density (gm/cm2):? 1.165 
% of peak bone mass: 112 
% for age matched controls:? 80 T-score: 0.9 Z-score: 1.4 Total Hip: Left Bone mineral density (gm/cm2):  1.196 
% of peak bone mass:   119 
% for age matched controls:  80 T-score:   1.5 
Z-score:  1.7 Lumbar Spine:  L1-4 Bone mineral density (gm/cm2):  1.463 
% of peak bone mass:  122  
% for age matched controls:  80 T-score:  2.2 Z-score:  3.0 T score the distal one third left radius 0.4 Impression Impression: This patient is normal using the World Health Organization criteria 10 year probability of major osteoporotic fracture:  2.6% 10 year probability of hip fracture:  0% Recommendations: 
Therapy recommendations need to be tailored to each individual patient. Using 
the [facility]) FRAX absolute fracture algorithm, the 
[address] [place] [address] recommends beginning pharmacological therapy in 
postmenopausal women and men over the age of 48 with a 8 year probability of a 
hip fracture of >3% OR with the 10 year probability of a major osteoporotic 
fracture of >20%. Please reconsider testing based on risk factors. Currently, Medicare will only 
reimburse for a central DXA examination every two years, unless the patient is 
on chronic glucocorticoid therapy. Note: Please note that reliable, valid comparisons cannot be made between 
studies which have been performed on machines from different manufacturers. If 
clinically warranted, a follow up study performed at this site, on the same 
unit, would allow the most sensitive assessment of change in bone mineral 
density. RECOMMENDATIONS: 
 
Bone density exam is normal.  
 
Please call me if you have any questions: [phone] Sincerely, THOMAS DEXA 1

## 2017-12-21 DIAGNOSIS — I10 ESSENTIAL HYPERTENSION: ICD-10-CM

## 2017-12-21 RX ORDER — VALSARTAN AND HYDROCHLOROTHIAZIDE 80; 12.5 MG/1; MG/1
TABLET, FILM COATED ORAL
Qty: 90 TAB | Refills: 2 | Status: SHIPPED | OUTPATIENT
Start: 2017-12-21 | End: 2018-08-06

## 2018-08-06 ENCOUNTER — TELEPHONE (OUTPATIENT)
Dept: INTERNAL MEDICINE CLINIC | Age: 68
End: 2018-08-06

## 2018-08-06 DIAGNOSIS — I10 ESSENTIAL HYPERTENSION: Primary | ICD-10-CM

## 2018-08-06 RX ORDER — LOSARTAN POTASSIUM AND HYDROCHLOROTHIAZIDE 12.5; 5 MG/1; MG/1
1 TABLET ORAL DAILY
Qty: 30 TAB | Refills: 1 | Status: SHIPPED | OUTPATIENT
Start: 2018-08-06 | End: 2018-09-05 | Stop reason: SDUPTHER

## 2018-08-06 NOTE — TELEPHONE ENCOUNTER
Spoke with patient regarding recall of Valsartan and Valsartan/HCTZ. Asked that patient contact pharmacy to determine if prescription was filled with the recalled lots. Discussed that Dr. Maci De La Cruz will be switching to a different medication within the same class. Advised that patient continue to take Valsartan in the meantime, as the risk of harm may be higher if treatment is stopped immediately without any alternative treatment. Advised to follow-up in 6 weeks for BP check.        Patient last seen: 10/30/17, /79

## 2018-08-07 ENCOUNTER — TELEPHONE (OUTPATIENT)
Dept: INTERNAL MEDICINE CLINIC | Age: 68
End: 2018-08-07

## 2018-08-07 NOTE — TELEPHONE ENCOUNTER
Patient called to discuss Valsartan recall further. States that when she went to the pharmacy yesterday to  new prescription of Losartan-HCTZ that the pharmacist informed her that the valsartan that she was prescribed from them was not in the contaminated lot. That particular pharmacy does not use any of the implicated brands. They told her to contact us before they provided the Losartan-HCTZ to see if she needs to switch or if she can remain on the [address] that she has been taking.

## 2018-08-07 NOTE — TELEPHONE ENCOUNTER
Please call patient:    Refer to last note:   If she willing to continue the Valsartan she can do that   She can just let us know her decision that way we can up date her med list.

## 2018-08-16 ENCOUNTER — TELEPHONE (OUTPATIENT)
Dept: INTERNAL MEDICINE CLINIC | Age: 68
End: 2018-08-16

## 2018-08-30 ENCOUNTER — TELEPHONE (OUTPATIENT)
Dept: INTERNAL MEDICINE CLINIC | Age: 68
End: 2018-08-30

## 2018-08-30 RX ORDER — LOSARTAN POTASSIUM AND HYDROCHLOROTHIAZIDE 12.5; 5 MG/1; MG/1
1 TABLET ORAL DAILY
Qty: 30 TAB | Refills: 1 | OUTPATIENT
Start: 2018-08-30

## 2018-08-30 NOTE — TELEPHONE ENCOUNTER
LVM for pt to return my call. I spoke with the pharmacist at [facility] and she stated that there is a refill there but it is too soon to fill it. She cannot fill it until 9/4.

## 2018-08-30 NOTE — TELEPHONE ENCOUNTER
Pt needs a refill on blood pressure medication, pharmacy is stating there were no addition refills with the prescription and this needs to be refilled.

## 2018-08-31 NOTE — TELEPHONE ENCOUNTER
Informed pt about refill issues, she understood. Just wanted to make sure the 90 day supply was sent to pharmacy so when she goes in on the 4th it will be filled correctly.

## 2018-09-05 RX ORDER — LOSARTAN POTASSIUM AND HYDROCHLOROTHIAZIDE 12.5; 5 MG/1; MG/1
1 TABLET ORAL DAILY
Qty: 90 TAB | Refills: 0 | Status: SHIPPED | OUTPATIENT
Start: 2018-09-05 | End: 2018-09-26 | Stop reason: SDUPTHER

## 2018-09-25 RX ORDER — VALSARTAN AND HYDROCHLOROTHIAZIDE 80; 12.5 MG/1; MG/1
1 TABLET, FILM COATED ORAL DAILY
Qty: 90 TAB | Refills: 0 | Status: SHIPPED | OUTPATIENT
Start: 2018-09-25 | End: 2018-09-26

## 2018-09-26 RX ORDER — LOSARTAN POTASSIUM AND HYDROCHLOROTHIAZIDE 12.5; 5 MG/1; MG/1
1 TABLET ORAL DAILY
Qty: 90 TAB | Refills: 0 | Status: SHIPPED | OUTPATIENT
Start: 2018-09-26 | End: 2018-11-12 | Stop reason: SDUPTHER

## 2018-09-26 NOTE — TELEPHONE ENCOUNTER
Pt stated she needs losartan sent to pharmacy. Valsartan was sent instead and she was told not to continue on this med. Please send new script in for 90 day supply of losartan for pt.     Last ov: 10/30/17   Last refill: 9/5/18  Last lab: 11/1/17  Pharmacy: [facility]    90 day supply

## 2018-09-28 ENCOUNTER — TELEPHONE (OUTPATIENT)
Dept: PRIMARY CARE CLINIC | Age: 68
End: 2018-09-28

## 2018-09-28 NOTE — TELEPHONE ENCOUNTER
appt October 9 at [facility] for mammogram. Requesting that an order be placed for her to have a chest xray while she is there. Please call to advise. Does she need an appt with Dr Maci De La Cruz to get this order placed? Can she just send the order?     [phone]

## 2018-10-09 ENCOUNTER — HOSPITAL ENCOUNTER (OUTPATIENT)
Dept: MAMMOGRAPHY | Age: 68
Discharge: HOME | End: 2018-10-09
Attending: FAMILY MEDICINE
Payer: MEDICARE

## 2018-10-09 DIAGNOSIS — Z12.31 VISIT FOR SCREENING MAMMOGRAM: ICD-10-CM

## 2018-10-09 PROCEDURE — 77067 SCR MAMMO BI INCL CAD: CPT

## 2018-11-12 ENCOUNTER — OFFICE VISIT (OUTPATIENT)
Dept: PRIMARY CARE CLINIC | Age: 68
End: 2018-11-12

## 2018-11-12 VITALS
HEIGHT: 67 IN | HEART RATE: 63 BPM | BODY MASS INDEX: 24.8 KG/M2 | WEIGHT: 158 LBS | DIASTOLIC BLOOD PRESSURE: 83 MMHG | TEMPERATURE: 98.2 F | OXYGEN SATURATION: 96 % | RESPIRATION RATE: 16 BRPM | SYSTOLIC BLOOD PRESSURE: 126 MMHG

## 2018-11-12 DIAGNOSIS — Z12.11 SCREEN FOR COLON CANCER: ICD-10-CM

## 2018-11-12 DIAGNOSIS — Z00.00 MEDICARE ANNUAL WELLNESS VISIT, SUBSEQUENT: Primary | ICD-10-CM

## 2018-11-12 DIAGNOSIS — I10 ESSENTIAL HYPERTENSION: ICD-10-CM

## 2018-11-12 DIAGNOSIS — Z23 ENCOUNTER FOR IMMUNIZATION: ICD-10-CM

## 2018-11-12 DIAGNOSIS — Z13.31 SCREENING FOR DEPRESSION: ICD-10-CM

## 2018-11-12 DIAGNOSIS — Z78.9 ADVANCE DIRECTIVE IN CHART: ICD-10-CM

## 2018-11-12 DIAGNOSIS — Z13.39 SCREENING FOR ALCOHOLISM: ICD-10-CM

## 2018-11-12 RX ORDER — VITAMIN E CAP 100 UNIT 100 UNIT
CAP ORAL DAILY
COMMUNITY

## 2018-11-12 RX ORDER — LOSARTAN POTASSIUM AND HYDROCHLOROTHIAZIDE 12.5; 5 MG/1; MG/1
1 TABLET ORAL DAILY
Qty: 90 TAB | Refills: 1 | Status: SHIPPED | OUTPATIENT
Start: 2018-11-12 | End: 2019-03-29 | Stop reason: RX

## 2018-11-12 NOTE — PROGRESS NOTES
Chief Complaint Patient presents with  Complete Physical  
  fasting 1. Have you been to the ER, urgent care clinic since your last visit? Hospitalized since your last visit? No 
 
2. Have you seen or consulted any other health care providers outside of the [address] [place] [address] since your last visit? Include any pap smears or colon screening.  No

## 2018-11-12 NOTE — PROGRESS NOTES
This is the Subsequent Medicare Annual Wellness Exam, performed 12 months or more after the Initial AWV or the last Subsequent AWV I have reviewed the patient's medical history in detail and updated the computerized patient record. 79 y.o. female with h/o HTN is here  for annual medical exam as well as follow up on chronic condition. BP has been well controlled with Hyzaar. Need refill. Exercises regularly. Patent reports that she has been doing well. All ROS is negative. History Past Medical History:  
Diagnosis Date  Hypertension  Prediabetes Past Surgical History:  
Procedure Laterality Date [address]  HX ORTHOPAEDIC  2-2012  
 right index finger Current Outpatient Medications Medication Sig Dispense Refill  vitamin e (E GEMS) 100 unit capsule Take  by mouth daily.  losartan-hydroCHLOROthiazide (HYZAAR) 50-12.5 mg per tablet Take 1 Tab by mouth daily. 90 Tab 0  
 ascorbic acid, vitamin C, (VITAMIN C) 250 mg tablet Take  by mouth.  cholecalciferol (VITAMIN D3) 1,000 unit cap Take  by mouth daily.  aspirin 81 mg chewable tablet Take 81 mg by mouth daily.  Biotin 2,500 mcg cap Take  by mouth.  multivitamin (ONE A DAY) tablet Take 1 Tab by mouth daily. No Known Allergies Family History Problem Relation Age of Onset  Diabetes Mother  Hypertension Mother  Heart Disease Mother  Cancer Father  Hypertension Father  Diabetes Brother  Hypertension Brother  Diabetes Brother  Hypertension Brother  Hypertension Brother Social History Tobacco Use  Smoking status: Never Smoker  Smokeless tobacco: Never Used Substance Use Topics  Alcohol use: No  
 
Patient Active Problem List  
Diagnosis Code  Essential hypertension I10  
 Post-menopausal Z78.0  Prediabetes R73.03  
 Presbyopia H52.4 Depression Risk Factor Screening: PHQ over the last two weeks 10/30/2017 Little interest or pleasure in doing things Not at all Feeling down, depressed, irritable, or hopeless Not at all Total Score PHQ 2 0 Alcohol Risk Factor Screening: You do not drink alcohol or very rarely. Functional Ability and Level of Safety:  
Hearing Loss Hearing is good. Activities of Daily Living The home contains: handrails, grab bars and rugs Patient does total self care Fall Risk Fall Risk Assessment, last 12 mths 10/30/2017 Able to walk? Yes Fall in past 12 months? No  
 
 
Abuse Screen Patient is not abused Gen: no acute distress HEENT: mucous membranes moist 
Thyroid: no enlargement or nodules noted CAD: normal rate, regular rhythm. No murmur rubs or gallops PULM: clear to ausculation, no wheezes, rhonchis or rales. GI: soft, nontender, nondistended Neuro: grossly non focal 
Psych: pleasant, cooperative, good insight into medical hx Skin: warm and dry Ext: no edema,  
 
 
Cognitive Screening Evaluation of Cognitive Function: 
Has your family/caregiver stated any concerns about your memory: no 
Normal 
 
Patient Care Team  
Patient Care Team: 
Florina Arboleda MD as PCP - General (Family Practice) Assessment/Plan Education and counseling provided: 
Are appropriate based on today's review and evaluation Pneumococcal Vaccine Influenza Vaccine Screening Mammography Screening Pap and pelvic (covered once every 2 years) Colorectal cancer screening tests Bone mass measurement (DEXA) Screening for glaucoma Diabetes screening test 
 
Diagnoses and all orders for this visit: 
 
1. Medicare annual wellness visit, subsequent -     RI ANNUAL ALCOHOL SCREEN 15 MIN 
-     DEPRESSION SCREEN ANNUAL 
-     ADMIN PNEUMOCOCCAL VACCINE 
-     PNEUMOCOCCAL POLYSACCHARIDE VACCINE, 23-VALENT, ADULT OR IMMUNOSUPPRESSED PT DOSE, 
-     CBC WITH AUTOMATED DIFF 
-     METABOLIC PANEL, COMPREHENSIVE 
-     LIPID PANEL 
 -     HEMOGLOBIN A1C WITH EAG 2. Essential hypertension -     METABOLIC PANEL, COMPREHENSIVE 
-   BP well controlled with   losartan-hydroCHLOROthiazide (HYZAAR) 50-12.5 mg per tablet; Take 1 Tab by mouth daily. 3. Screening for alcoholism -     [place] ANNUAL ALCOHOL SCREEN 15 MIN 4. Screening for depression 
-     [facility]  No depression. 5. Encounter for immunization -     ADMIN PNEUMOCOCCAL VACCINE 
-     PNEUMOCOCCAL POLYSACCHARIDE VACCINE, 23-VALENT, ADULT OR IMMUNOSUPPRESSED PT DOSE, 6. Screen for colon cancer 
-     REFERRAL FOR COLONOSCOPY 7. Advance directive in chart Health Maintenance Due Topic Date Due  Shingrix Vaccine Age 50> (1 of 2) 01/22/2002  GLAUCOMA SCREENING Q2Y  01/22/2017  
 FOBT Q 1 YEAR, 18+  10/30/2018

## 2018-11-13 LAB
ALBUMIN SERPL-MCNC: 4.1 G/DL (ref 3.6–4.8)
ALBUMIN/GLOB SERPL: 1.6 {RATIO} (ref 1.2–2.2)
ALP SERPL-CCNC: 46 IU/L (ref 39–117)
ALT SERPL-CCNC: 15 IU/L (ref 0–32)
AST SERPL-CCNC: 20 IU/L (ref 0–40)
BASOPHILS # BLD AUTO: 0.1 X10E3/UL (ref 0–0.2)
BASOPHILS NFR BLD AUTO: 1 %
BILIRUB SERPL-MCNC: 0.4 MG/DL (ref 0–1.2)
BUN SERPL-MCNC: 14 MG/DL (ref 8–27)
BUN/CREAT SERPL: 15 (ref 12–28)
CALCIUM SERPL-MCNC: 9.1 MG/DL (ref 8.7–10.3)
CHLORIDE SERPL-SCNC: 101 MMOL/L (ref 96–106)
CHOLEST SERPL-MCNC: 190 MG/DL (ref 100–199)
CO2 SERPL-SCNC: 25 MMOL/L (ref 20–29)
CREAT SERPL-MCNC: 0.91 MG/DL (ref 0.57–1)
EOSINOPHIL # BLD AUTO: 0.2 X10E3/UL (ref 0–0.4)
EOSINOPHIL NFR BLD AUTO: 3 %
ERYTHROCYTE [DISTWIDTH] IN BLOOD BY AUTOMATED COUNT: 14.4 % (ref 12.3–15.4)
EST. AVERAGE GLUCOSE BLD GHB EST-MCNC: 114 MG/DL
GLOBULIN SER CALC-MCNC: 2.6 G/DL (ref 1.5–4.5)
GLUCOSE SERPL-MCNC: 82 MG/DL (ref 65–99)
HBA1C MFR BLD: 5.6 % (ref 4.8–5.6)
HCT VFR BLD AUTO: 37.9 % (ref 34–46.6)
HDLC SERPL-MCNC: 65 MG/DL
HGB BLD-MCNC: 12.1 G/DL (ref 11.1–15.9)
IMM GRANULOCYTES # BLD: 0 X10E3/UL (ref 0–0.1)
IMM GRANULOCYTES NFR BLD: 0 %
LDLC SERPL CALC-MCNC: 113 MG/DL (ref 0–99)
LYMPHOCYTES # BLD AUTO: 2.2 X10E3/UL (ref 0.7–3.1)
LYMPHOCYTES NFR BLD AUTO: 48 %
MCH RBC QN AUTO: 28.5 PG (ref 26.6–33)
MCHC RBC AUTO-ENTMCNC: 31.9 G/DL (ref 31.5–35.7)
MCV RBC AUTO: 89 FL (ref 79–97)
MONOCYTES # BLD AUTO: 0.4 X10E3/UL (ref 0.1–0.9)
MONOCYTES NFR BLD AUTO: 9 %
NEUTROPHILS # BLD AUTO: 1.8 X10E3/UL (ref 1.4–7)
NEUTROPHILS NFR BLD AUTO: 39 %
PLATELET # BLD AUTO: 228 X10E3/UL (ref 150–379)
POTASSIUM SERPL-SCNC: 4 MMOL/L (ref 3.5–5.2)
PROT SERPL-MCNC: 6.7 G/DL (ref 6–8.5)
RBC # BLD AUTO: 4.25 X10E6/UL (ref 3.77–5.28)
SODIUM SERPL-SCNC: 139 MMOL/L (ref 134–144)
TRIGL SERPL-MCNC: 58 MG/DL (ref 0–149)
VLDLC SERPL CALC-MCNC: 12 MG/DL (ref 5–40)
WBC # BLD AUTO: 4.6 X10E3/UL (ref 3.4–10.8)

## 2018-11-15 NOTE — PROGRESS NOTES
Please mail letter: 1. LDL level is slightly above normal range and stable compare to last times. Continue work on [email or web address] and exercise. 2. CBC, kidney, liver function is normal.  
 
3. No diabetes.

## 2019-02-01 ENCOUNTER — TELEPHONE (OUTPATIENT)
Dept: PRIMARY CARE CLINIC | Age: 69
End: 2019-02-01

## 2019-02-01 NOTE — TELEPHONE ENCOUNTER
Dr Dahl/Telephone   Received: [facility]             Pt is reporting that she was recommended to have colonoscopy, with Dr Marycruz Garcia at [facility], she has contacted the doctor, however, no one has returned a call to schedule. Also, needs information regarding her shingles shot.      Best contact [phone]

## 2019-03-29 ENCOUNTER — TELEPHONE (OUTPATIENT)
Dept: PRIMARY CARE CLINIC | Age: 69
End: 2019-03-29

## 2019-03-29 DIAGNOSIS — I10 ESSENTIAL HYPERTENSION: Primary | ICD-10-CM

## 2019-03-29 RX ORDER — OLMESARTAN MEDOXOMIL AND HYDROCHLOROTHIAZIDE 20/12.5 20; 12.5 MG/1; MG/1
1 TABLET ORAL DAILY
Qty: 30 TAB | Refills: 0 | Status: SHIPPED | OUTPATIENT
Start: 2019-03-29 | End: 2019-04-20 | Stop reason: SDUPTHER

## 2019-03-29 NOTE — TELEPHONE ENCOUNTER
Prescription in stock and covered by pt's insurance. LVM notifying patient to monitor BP and notify office is any changes with new BP medication.

## 2019-03-29 NOTE — TELEPHONE ENCOUNTER
We can try Olmesartan-HCTZ. Will send over to pharmacy. She should monitor BP at home and follow-up with any signficant change in BP with new med.

## 2019-03-29 NOTE — TELEPHONE ENCOUNTER
----- Message from Felix Mancia sent at 3/28/2019  8:09 PM EDT -----  Regarding: Dr. Meera Hyman / Rx refill  Contact: [phone]  Pt requested a return call. Pt said the [facility] Pharmacist stated the Rx Losartan 50 mgs is not available in any of the [facility] pharmacies or other pharmacies. Pt is leaving to go out [place] on [place]. Pharmacy has made 4 attempts

## 2019-04-20 DIAGNOSIS — I10 ESSENTIAL HYPERTENSION: ICD-10-CM

## 2019-04-21 RX ORDER — OLMESARTAN MEDOXOMIL AND HYDROCHLOROTHIAZIDE 20/12.5 20; 12.5 MG/1; MG/1
TABLET ORAL
Qty: 30 TAB | Refills: 0 | Status: SHIPPED | OUTPATIENT
Start: 2019-04-21 | End: 2019-05-09 | Stop reason: SDUPTHER

## 2019-05-09 ENCOUNTER — OFFICE VISIT (OUTPATIENT)
Dept: PRIMARY CARE CLINIC | Age: 69
End: 2019-05-09

## 2019-05-09 VITALS
HEIGHT: 67 IN | TEMPERATURE: 98.5 F | SYSTOLIC BLOOD PRESSURE: 127 MMHG | DIASTOLIC BLOOD PRESSURE: 76 MMHG | WEIGHT: 160 LBS | RESPIRATION RATE: 17 BRPM | BODY MASS INDEX: 25.11 KG/M2 | HEART RATE: 63 BPM | OXYGEN SATURATION: 98 %

## 2019-05-09 DIAGNOSIS — I10 ESSENTIAL HYPERTENSION: Primary | ICD-10-CM

## 2019-05-09 DIAGNOSIS — Z23 NEED FOR TDAP VACCINATION: ICD-10-CM

## 2019-05-09 DIAGNOSIS — Z71.84 TRAVEL ADVICE ENCOUNTER: ICD-10-CM

## 2019-05-09 RX ORDER — OLMESARTAN MEDOXOMIL AND HYDROCHLOROTHIAZIDE 20/12.5 20; 12.5 MG/1; MG/1
TABLET ORAL
Qty: 90 TAB | Refills: 1 | Status: SHIPPED | OUTPATIENT
Start: 2019-05-09 | End: 2019-10-23 | Stop reason: SDUPTHER

## 2019-05-09 NOTE — PROGRESS NOTES
Subjective:     Chief Complaint   Patient presents with    Immunization/Injection     tdap    Other     6 month f/u        She  is a 81y.o. year old female who presents today for 6 months follow up as well as get Tdap vaccination for her up coming mission trip. HTN: BP has been well controlled with Benicar. No side effect. Patient reports that she will be going to [facility] this year. Need Tdap vaccine. She will get Typhoid vaccine in [facility]. Otherwise she has been doing well. Denies any chest pain, soa, cough. Pertinent items are noted in HPI.   Objective:     Vitals:    05/09/19 1143   BP: 127/76   Pulse: 63   Resp: 17   Temp: 98.5 °F (36.9 °C)   TempSrc: Oral   SpO2: 98%   Weight: 160 lb (72.6 kg)   Height: 5' 6.5\" (1.689 m)       Physical Examination: General appearance - alert, well appearing, and in no distress, oriented to person, place, and time and overweight  Mental status - alert, oriented to person, place, and time, normal mood, behavior, speech, dress, motor activity, and thought processes  Chest - clear to auscultation, no wheezes, rales or rhonchi, symmetric air entry  Heart - normal rate, regular rhythm, normal S1, S2, no murmurs, rubs, clicks or gallops    No Known Allergies   Social History     Socioeconomic History    Marital status: SINGLE     Spouse name: Not on file    Number of children: Not on file    Years of education: Not on file    Highest education level: Not on file   Tobacco Use    Smoking status: Never Smoker    Smokeless tobacco: Never Used   Substance and Sexual Activity    Alcohol use: No    Drug use: No    Sexual activity: Never      Family History   Problem Relation Age of Onset    Diabetes Mother     Hypertension Mother     Heart Disease Mother     Cancer Father     Hypertension Father     Diabetes Brother     Hypertension Brother     Diabetes Brother     Hypertension Brother     Hypertension Brother       Past Surgical History:   Procedure Laterality Date    HX HYSTERECTOMY  1980    HX ORTHOPAEDIC  2-2012    right index finger      Past Medical History:   Diagnosis Date    Hypertension     Prediabetes       Current Outpatient Medications   Medication Sig Dispense Refill    olmesartan-hydroCHLOROthiazide (BENICAR HCT) 20-12.5 mg per tablet TAKE 1 TABLET BY MOUTH EVERY DAY 30 Tab 0    vitamin e (E GEMS) 100 unit capsule Take  by mouth daily.  ascorbic acid, vitamin C, (VITAMIN C) 250 mg tablet Take  by mouth.  cholecalciferol (VITAMIN D3) 1,000 unit cap Take  by mouth daily.  aspirin 81 mg chewable tablet Take 81 mg by mouth daily.  Biotin 2,500 mcg cap Take  by mouth.  multivitamin (ONE A DAY) tablet Take 1 Tab by mouth daily. Assessment/ Plan:   Diagnoses and all orders for this visit:    1. Essential hypertension  -   Well controlled with  olmesartan-hydroCHLOROthiazide (BENICAR HCT) 20-12.5 mg per tablet; TAKE 1 TABLET BY MOUTH EVERY DAY    2. Need for Tdap vaccination  -     TETANUS, DIPHTHERIA TOXOIDS AND ACELLULAR PERTUSSIS VACCINE (TDAP), IN INDIVIDS. >=7, IM           Medication risks/benefits/costs/interactions/alternatives discussed with patient. Advised patient to call back or return to office if symptoms worsen/change/persist. If patient cannot reach us or should anything more severe/urgent arise he/she should proceed directly to the nearest emergency department. Discussed expected course/resolution/complications of diagnosis in detail with patient. Patient given a written after visit summary which includes her diagnoses, current medications and vitals. Patient expressed understanding with the diagnosis and plan. Follow-up and Dispositions    · Return in about 6 months (around 11/18/2019) for medicare well ness. Edna Callejas

## 2019-09-24 ENCOUNTER — TELEPHONE (OUTPATIENT)
Dept: PRIMARY CARE CLINIC | Age: 69
End: 2019-09-24

## 2019-09-24 NOTE — TELEPHONE ENCOUNTER
Patient called to find out if olmesartan is the bp medicine that has been recalled    She would like a return call

## 2019-11-22 DIAGNOSIS — I10 ESSENTIAL HYPERTENSION: ICD-10-CM

## 2019-11-22 RX ORDER — OLMESARTAN MEDOXOMIL AND HYDROCHLOROTHIAZIDE 20/12.5 20; 12.5 MG/1; MG/1
TABLET ORAL
Qty: 30 TAB | Refills: 2 | Status: SHIPPED | OUTPATIENT
Start: 2019-11-22 | End: 2020-01-24

## 2019-12-31 ENCOUNTER — HOSPITAL ENCOUNTER (OUTPATIENT)
Dept: MAMMOGRAPHY | Age: 69
Discharge: HOME | End: 2019-12-31
Attending: FAMILY MEDICINE
Payer: MEDICARE

## 2019-12-31 DIAGNOSIS — Z12.31 VISIT FOR SCREENING MAMMOGRAM: ICD-10-CM

## 2019-12-31 PROCEDURE — 77067 SCR MAMMO BI INCL CAD: CPT

## 2020-01-02 ENCOUNTER — TELEPHONE (OUTPATIENT)
Dept: PRIMARY CARE CLINIC | Age: 70
End: 2020-01-02

## 2020-01-02 NOTE — TELEPHONE ENCOUNTER
Pt requested a yellow fever vaccination to be sent to publ # [phone] # [phone].  Pt is going to [facility] ([place]) and need to have yellow fever vaccine before 60 days

## 2020-02-13 ENCOUNTER — TELEPHONE (OUTPATIENT)
Dept: PRIMARY CARE CLINIC | Age: 70
End: 2020-02-13

## 2020-02-13 NOTE — TELEPHONE ENCOUNTER
Patient says that Soy Milk helps with hot flashes. She is going to [facility] ([place] for 15 days and doesn't have access to soy milk but [address] suggested Soy Pills and wanted to make sure it's safe to take while she's gone or whatever you suggest for her to take in place of the milk.

## 2020-02-14 NOTE — TELEPHONE ENCOUNTER
I am not very familiar with soy pills but I did some research on it and I did not see any interaction with your BP med.

## 2020-03-22 DIAGNOSIS — I10 ESSENTIAL HYPERTENSION: ICD-10-CM

## 2020-03-23 RX ORDER — OLMESARTAN MEDOXOMIL AND HYDROCHLOROTHIAZIDE 20/12.5 20; 12.5 MG/1; MG/1
TABLET ORAL
Qty: 30 TAB | Refills: 0 | Status: SHIPPED | OUTPATIENT
Start: 2020-03-23 | End: 2020-03-26 | Stop reason: SDUPTHER

## 2020-04-14 NOTE — TELEPHONE ENCOUNTER
PREETHIM notifying patient that 30 day supply was sent to the pharmacy on 3/26/2020. She needs to call back to schedule a telemedicine visit for additional refill.

## 2020-04-15 ENCOUNTER — VIRTUAL VISIT (OUTPATIENT)
Dept: PRIMARY CARE CLINIC | Age: 70
End: 2020-04-15

## 2020-04-15 DIAGNOSIS — I10 ESSENTIAL HYPERTENSION: ICD-10-CM

## 2020-04-15 RX ORDER — OLMESARTAN MEDOXOMIL AND HYDROCHLOROTHIAZIDE 20/12.5 20; 12.5 MG/1; MG/1
TABLET ORAL
Qty: 90 TAB | Refills: 0 | Status: SHIPPED | OUTPATIENT
Start: 2020-04-15 | End: 2020-07-28

## 2020-04-15 NOTE — PROGRESS NOTES
Consent: Mary Alice Jolly, who was seen by synchronous (real-time) audio-video technology, and/or her healthcare decision maker, is aware that this patient-initiated, Telehealth encounter on 4/15/2020 is a billable service, with coverage as determined by her insurance carrier. She is aware that she may receive a bill and has provided verbal consent to proceed: Yes. Assessment & Plan:   Diagnoses and all orders for this visit:    1. Essential hypertension  -  BP well controlled with   olmesartan-hydroCHLOROthiazide (BENICAR HCT) 20-12.5 mg per tablet; TAKE 1 TABLET BY MOUTH EVERY DAY.   -     METABOLIC PANEL, BASIC             Follow-up and Dispositions    · Return in about 2 months (around 6/15/2020) for Medicare well ness, BP folow up. .               712  Subjective:   Mary Alice Jolly is a 78 y.o. female who was seen for Hypertension    She  is a 78y.o. year old pleasant  female who presents today for  follow up on blood pressure.      HTN: BP has been well controlled with Benicar. No side effect. Denies any chest pain, soa, cough. States that she has been doing well. No concerns. Prior to Admission medications    Medication Sig Start Date End Date Taking? Authorizing Provider   olmesartan-hydroCHLOROthiazide (BENICAR HCT) 20-12.5 mg per tablet TAKE 1 TABLET BY MOUTH EVERY DAY. Need appointment 4/15/20  Yes Jim Olea MD   vitamin e (E GEMS) 100 unit capsule Take  by mouth daily. Provider, Historical   ascorbic acid, vitamin C, (VITAMIN C) 250 mg tablet Take  by mouth. Provider, Historical   cholecalciferol (VITAMIN D3) 1,000 unit cap Take  by mouth daily. Provider, Historical   aspirin 81 mg chewable tablet Take 81 mg by mouth daily. Provider, Historical   Biotin 2,500 mcg cap Take  by mouth. Provider, Historical   multivitamin (ONE A DAY) tablet Take 1 Tab by mouth daily.     Provider, Historical     No Known Allergies    Patient Active Problem List   Diagnosis Code    Essential hypertension I10    Post-menopausal Z78.0    Prediabetes R73.03    Presbyopia H52.4    Advance directive in chart Z78.9     Current Outpatient Medications   Medication Sig Dispense Refill    olmesartan-hydroCHLOROthiazide (BENICAR HCT) 20-12.5 mg per tablet TAKE 1 TABLET BY MOUTH EVERY DAY. Need appointment 90 Tab 0    vitamin e (E GEMS) 100 unit capsule Take  by mouth daily.  ascorbic acid, vitamin C, (VITAMIN C) 250 mg tablet Take  by mouth.  cholecalciferol (VITAMIN D3) 1,000 unit cap Take  by mouth daily.  aspirin 81 mg chewable tablet Take 81 mg by mouth daily.  Biotin 2,500 mcg cap Take  by mouth.  multivitamin (ONE A DAY) tablet Take 1 Tab by mouth daily. No Known Allergies  Past Medical History:   Diagnosis Date    Hypertension     Prediabetes      Past Surgical History:   Procedure Laterality Date    HX HYSTERECTOMY  38    HX ORTHOPAEDIC  2-2012    right index finger       Review of Systems   Constitutional: Negative for fever. Cardiovascular: Negative for chest pain and palpitations. Objective:   Vital Signs: (As obtained by patient/caregiver at home)  Visit Vitals  /80   Pulse 86   Addendum: patient called today (4/23/2020) with the above BP number per my advise.      [INSTRUCTIONS:  \"[x]\" Indicates a positive item  \"[]\" Indicates a negative item  -- DELETE ALL ITEMS NOT EXAMINED]    Constitutional: [x] Appears well-developed and well-nourished [x] No apparent distress      [] Abnormal -     Mental status: [x] Alert and awake  [x] Oriented to person/place/time [x] Able to follow commands    [] Abnormal -     Eyes:   EOM    [x]  Normal    [] Abnormal -   Sclera  [x]  Normal    [] Abnormal -          Discharge [x]  None visible   [] Abnormal -     HENT: [x] Normocephalic, atraumatic  [] Abnormal -   [x] Mouth/Throat: Mucous membranes are moist    External Ears [x] Normal  [] Abnormal -    Neck: [x] No visualized mass [] Abnormal - Pulmonary/Chest: [x] Respiratory effort normal   [x] No visualized signs of difficulty breathing or respiratory distress        [] Abnormal -      Musculoskeletal:   [x] Normal gait with no signs of ataxia         [x] Normal range of motion of neck        [] Abnormal -     Neurological:        [x] No Facial Asymmetry (Cranial nerve 7 motor function) (limited exam due to video visit)          [x] No gaze palsy        [] Abnormal -          Skin:        [x] No significant exanthematous lesions or discoloration noted on facial skin         [] Abnormal -            Psychiatric:       [x] Normal Affect [] Abnormal -        [x] No Hallucinations    Other pertinent observable physical exam findings:-        We discussed the expected course, resolution and complications of the diagnosis(es) in detail. Medication risks, benefits, costs, interactions, and alternatives were discussed as indicated. I advised her to contact the office if her condition worsens, changes or fails to improve as anticipated. She expressed understanding with the diagnosis(es) and plan. Romie Alvarado is a 78 y.o. female being evaluated by a video visit encounter for concerns as above. A caregiver was present when appropriate. Due to this being a TeleHealth encounter (During [facility]-54 public health emergency), evaluation of the following organ systems was limited: Vitals/Constitutional/EENT/Resp/CV/GI//MS/Neuro/Skin/Heme-Lymph-Imm. Pursuant to the emergency declaration under the [facility], 1135 waiver authority and the [employer] Act, this Virtual  Visit was conducted, with patient's (and/or legal guardian's) consent, to reduce the patient's risk of exposure to COVID-19 and provide necessary medical care. Services were provided through a video synchronous discussion virtually to substitute for in-person clinic visit.    Patient and provider were located at their individual homes.         Eligio Archer MD

## 2020-04-17 ENCOUNTER — TELEPHONE (OUTPATIENT)
Dept: PRIMARY CARE CLINIC | Age: 70
End: 2020-04-17

## 2020-04-17 NOTE — TELEPHONE ENCOUNTER
Per pharmacist, he states that they did receive the prescription. It was too soon to fill when they first received it but it is processing and she can call them and  today.

## 2020-04-17 NOTE — TELEPHONE ENCOUNTER
----- Message from Ishmael Miller sent at 4/17/2020  2:45 PM EDT -----  Regarding: Dr. Feldman Yusuf: [phone] (if not patient): n/a  Relationship of caller (if not patient):n/a  Best contact number(s): [phone]  Name of medication and dosage if known: Olmesartan HCTZ 220/12.5  Is patient out of this medication (yes/no): yes  Pharmacy name: n/a  Pharmacy listed in chart? (yes/no):yes  Pharmacy phone number: n/a  Date of last visit: n/a  Details to clarify the request: Patient states that the prescription was never filled

## 2020-04-23 ENCOUNTER — TELEPHONE (OUTPATIENT)
Dept: PRIMARY CARE CLINIC | Age: 70
End: 2020-04-23

## 2020-04-23 VITALS — HEART RATE: 86 BPM | SYSTOLIC BLOOD PRESSURE: 128 MMHG | DIASTOLIC BLOOD PRESSURE: 80 MMHG

## 2020-06-15 ENCOUNTER — VIRTUAL VISIT (OUTPATIENT)
Dept: PRIMARY CARE CLINIC | Age: 70
End: 2020-06-15

## 2020-06-15 DIAGNOSIS — I10 ESSENTIAL HYPERTENSION: ICD-10-CM

## 2020-06-15 DIAGNOSIS — Z78.0 POSTMENOPAUSAL STATE: ICD-10-CM

## 2020-06-15 DIAGNOSIS — Z00.00 MEDICARE ANNUAL WELLNESS VISIT, SUBSEQUENT: Primary | ICD-10-CM

## 2020-06-15 DIAGNOSIS — R73.09 ELEVATED GLYCOSYLATED HEMOGLOBIN: ICD-10-CM

## 2020-06-15 DIAGNOSIS — Z13.31 SCREENING FOR DEPRESSION: ICD-10-CM

## 2020-06-15 NOTE — PATIENT INSTRUCTIONS
Medicare Wellness Visit, Female The best way to live healthy is to have a lifestyle where you eat a well-balanced diet, exercise regularly, limit alcohol use, and quit all forms of tobacco/nicotine, if applicable. Regular preventive services are another way to keep healthy. Preventive services (vaccines, screening tests, monitoring & exams) can help personalize your care plan, which helps you manage your own care. Screening tests can find health problems at the earliest stages, when they are easiest to treat. [facility] follows the current, evidence-based guidelines published by the [facility] ([facility]) when recommending preventive services for our patients. Because we follow these guidelines, sometimes recommendations change over time as research supports it. (For example, mammograms used to be recommended annually. Even though Medicare will still pay for an annual mammogram, the newer guidelines recommend a mammogram every two years for women of average risk). Of course, you and your doctor may decide to screen more often for some diseases, based on your risk and your co-morbidities (chronic disease you are already diagnosed with). Preventive services for you include: - Medicare offers their members a free annual wellness visit, which is time for you and your primary care provider to discuss and plan for your preventive service needs. Take advantage of this benefit every year! 
-All adults over the age of 72 should receive the recommended pneumonia vaccines. Current USPSTF guidelines recommend a series of two vaccines for the best pneumonia protection.  
-All adults should have a flu vaccine yearly and a tetanus vaccine every 10 years.  
-All adults age 48 and older should receive the shingles vaccines (series of two vaccines). -All adults age 38-68 who are overweight should have a diabetes screening test once every three years. -All adults born between [birth year] and 1963 should be screened once for Hepatitis C. 
-Other screening tests and preventive services for persons with diabetes include: an eye exam to screen for diabetic retinopathy, a kidney function test, a foot exam, and stricter control over your cholesterol.  
-Cardiovascular screening for adults with routine risk involves an electrocardiogram (ECG) at intervals determined by your doctor.  
-Colorectal cancer screenings should be done for adults age 54-65 with no increased risk factors for colorectal cancer. There are a number of acceptable methods of screening for this type of cancer. Each test has its own benefits and drawbacks. Discuss with your doctor what is most appropriate for you during your annual wellness visit. The different tests include: colonoscopy (considered the best screening method), a fecal occult blood test, a fecal DNA test, and sigmoidoscopy. 
 
-A bone mass density test is recommended when a woman turns 65 to screen for osteoporosis. This test is only recommended one time, as a screening. Some providers will use this same test as a disease monitoring tool if you already have osteoporosis. -Breast cancer screenings are recommended every other year for women of normal risk, age 54-69. 
-Cervical cancer screenings for women over age 72 are only recommended with certain risk factors. Here is a list of your current Health Maintenance items (your personalized list of preventive services) with a due date: 
Health Maintenance Due Topic Date Due  Shingles Vaccine (1 of 2) 01/22/2002  Glaucoma Screening   01/22/2017  Stool testing for trace blood  10/12/2019  Hemoglobin A1C    11/12/2019 [address] [place] [address] Annual Well Visit  11/13/2019  Bone Densitometry  11/20/2019

## 2020-06-15 NOTE — PROGRESS NOTES
This is the Subsequent Medicare Annual Wellness Exam, performed 12 months or more after the Initial AWV or the last Subsequent AWV    Consent: Mary Alice Jolly, who was seen by synchronous (real-time) audio-video technology, and/or her healthcare decision maker, is aware that this patient-initiated, Telehealth encounter on 6/15/2020 is a billable service. While AWVs are fully covered by Medicare, any services rendered on this date that are not included in an AWV are subject to additional billing, with coverage as determined by her insurance carrier. She is aware that she may receive a bill for any such additional services and has provided verbal consent to proceed: Yes. I have reviewed the patient's medical history in detail and updated the computerized patient record. BP well controled with Benicar. Hx of Prediabetes. Last colonoscopy was done in 7/26/2019. History     Patient Active Problem List   Diagnosis Code    Essential hypertension I10    Post-menopausal Z78.0    Prediabetes R73.03    Presbyopia H52.4    Advance directive in chart Z78.9     Past Medical History:   Diagnosis Date    Hypertension     Prediabetes       Past Surgical History:   Procedure Laterality Date    HX HYSTERECTOMY  38    HX ORTHOPAEDIC  2-2012    right index finger     Current Outpatient Medications   Medication Sig Dispense Refill    olmesartan-hydroCHLOROthiazide (BENICAR HCT) 20-12.5 mg per tablet TAKE 1 TABLET BY MOUTH EVERY DAY. Need appointment 90 Tab 0    vitamin e (E GEMS) 100 unit capsule Take  by mouth daily.  ascorbic acid, vitamin C, (VITAMIN C) 250 mg tablet Take  by mouth.  cholecalciferol (VITAMIN D3) 1,000 unit cap Take  by mouth daily.  aspirin 81 mg chewable tablet Take 81 mg by mouth daily.  Biotin 2,500 mcg cap Take  by mouth.  multivitamin (ONE A DAY) tablet Take 1 Tab by mouth daily.        No Known Allergies    Family History   Problem Relation Age of Onset    Diabetes Mother     Hypertension Mother     Heart Disease Mother     Cancer Father     Hypertension Father     Diabetes Brother     Hypertension Brother     Diabetes Brother     Hypertension Brother     Hypertension Brother      Social History     Tobacco Use    Smoking status: Never Smoker    Smokeless tobacco: Never Used   Substance Use Topics    Alcohol use: No       Depression Risk Factor Screening:     3 most recent PHQ Screens 6/15/2020   Little interest or pleasure in doing things Not at all   Feeling down, depressed, irritable, or hopeless Not at all   Total Score PHQ 2 0       Alcohol Risk Factor Screening:   Do you average 1 drink per night or more than 7 drinks a week:  No    On any one occasion in the past three months have you have had more than 3 drinks containing alcohol:  No      Functional Ability and Level of Safety:   Hearing: Hearing is good. Activities of Daily Living: The home contains: handrails, grab bars and rugs  Patient does total self care    Ambulation: with no difficulty    Fall Risk:  Fall Risk Assessment, last 12 mths 6/15/2020   Able to walk? Yes   Fall in past 12 months? No       Abuse Screen:  Patient is not abused    ROS: denies any chest pain, soa, cough, abdominal pain. PE:    AAOX3. No respiratory distress. Cognitive Screening   Has your family/caregiver stated any concerns about your memory: no  Cognitive Screening: Normal - Verbal Fluency Test    Patient Care Team   Patient Care Team:  Camille Jesus MD as PCP - General (Family Practice)  Camille Jesus MD as PCP - [facility]    Assessment/Plan   Education and counseling provided:  Are appropriate based on today's review and evaluation  End-of-Life planning (with patient's consent)  Screening Mammography  Colorectal cancer screening tests  Bone mass measurement (DEXA)  Screening for glaucoma  shingle vaccine    Diagnoses and all orders for this visit:    1.  Medicare annual wellness visit, subsequent    2. Screening for depression  -     DEPRESSION SCREEN ANNUAL    3. Postmenopausal state  -     DEXA BONE DENSITY STUDY AXIAL; Future    4. Essential hypertension  -  Well controlled. METABOLIC PANEL, BASIC    5. Elevated glycosylated hemoglobin  -     HEMOGLOBIN A1C WITH EAG        Health Maintenance Due   Topic Date Due    Shingrix Vaccine Age 49> (1 of 2) 01/22/2002    GLAUCOMA SCREENING Q2Y  01/22/2017    A1C test (Diabetic or Prediabetic)  11/12/2019    Medicare Yearly Exam  11/13/2019    Bone Densitometry  11/20/2019       Anastasiya Ariza is a 78 y.o. female who was evaluated by an audio-video encounter for concerns as above. Patient identification was verified prior to start of the visit. A caregiver was present when appropriate. Due to this being a TeleHealth encounter (During [facility]-52 public health emergency), evaluation of the following organ systems was limited: Vitals/Constitutional/EENT/Resp/CV/GI//MS/Neuro/Skin/Heme-Lymph-Imm. Pursuant to the emergency declaration under the [facility], 1135 waiver authority and the [employer] Act, this Virtual Visit was conducted, with patient's (and/or legal guardian's) consent, to reduce the patient's risk of exposure to COVID-19 and provide necessary medical care. Services were provided through a synchronous discussion virtually to substitute for in-person clinic visit. I was in the office. The patient was at home.       Emir Jacobs MD

## 2020-06-18 LAB
BUN SERPL-MCNC: 17 MG/DL (ref 8–27)
BUN/CREAT SERPL: 18 (ref 12–28)
CALCIUM SERPL-MCNC: 9.1 MG/DL (ref 8.7–10.3)
CHLORIDE SERPL-SCNC: 103 MMOL/L (ref 96–106)
CO2 SERPL-SCNC: 25 MMOL/L (ref 20–29)
CREAT SERPL-MCNC: 0.93 MG/DL (ref 0.57–1)
EST. AVERAGE GLUCOSE BLD GHB EST-MCNC: 114 MG/DL
GLUCOSE SERPL-MCNC: 87 MG/DL (ref 65–99)
HBA1C MFR BLD: 5.6 % (ref 4.8–5.6)
POTASSIUM SERPL-SCNC: 4.2 MMOL/L (ref 3.5–5.2)
SODIUM SERPL-SCNC: 139 MMOL/L (ref 134–144)

## 2020-06-22 ENCOUNTER — HOSPITAL ENCOUNTER (OUTPATIENT)
Dept: BONE DENSITY | Age: 70
Discharge: HOME | End: 2020-06-22
Attending: FAMILY MEDICINE
Payer: MEDICARE

## 2020-06-22 DIAGNOSIS — Z78.0 POSTMENOPAUSAL STATE: ICD-10-CM

## 2020-06-22 PROCEDURE — 77080 DXA BONE DENSITY AXIAL: CPT

## 2020-07-28 DIAGNOSIS — I10 ESSENTIAL HYPERTENSION: ICD-10-CM

## 2020-07-28 RX ORDER — OLMESARTAN MEDOXOMIL AND HYDROCHLOROTHIAZIDE 20/12.5 20; 12.5 MG/1; MG/1
TABLET ORAL
Qty: 90 TAB | Refills: 0 | Status: SHIPPED | OUTPATIENT
Start: 2020-07-28 | End: 2020-10-22 | Stop reason: SDUPTHER

## 2020-12-18 ENCOUNTER — TRANSCRIBE ORDER (OUTPATIENT)
Dept: SCHEDULING | Age: 70
End: 2020-12-18

## 2020-12-18 DIAGNOSIS — Z12.31 VISIT FOR SCREENING MAMMOGRAM: Primary | ICD-10-CM

## 2021-01-22 ENCOUNTER — HOSPITAL ENCOUNTER (OUTPATIENT)
Dept: MAMMOGRAPHY | Age: 71
Discharge: HOME | End: 2021-01-22
Attending: FAMILY MEDICINE
Payer: MEDICARE

## 2021-01-22 DIAGNOSIS — Z12.31 VISIT FOR SCREENING MAMMOGRAM: ICD-10-CM

## 2021-01-22 PROCEDURE — 77067 SCR MAMMO BI INCL CAD: CPT

## 2021-01-27 ENCOUNTER — TELEPHONE (OUTPATIENT)
Dept: PRIMARY CARE CLINIC | Age: 71
End: 2021-01-27

## 2021-01-27 NOTE — TELEPHONE ENCOUNTER
Called and spoke with patient her TPO was 238 and they referred her to Endo and she has an appointment with them on 2/19/21.  Just wanted to make you aware and then she will follow up with you after the appointment on 2/19

## 2021-01-27 NOTE — TELEPHONE ENCOUNTER
Patient would like a call back in regards to her thyroid results she got from [address]  [facility]. She has some things she would like to discuss.

## 2021-02-19 ENCOUNTER — VIRTUAL VISIT (OUTPATIENT)
Dept: ENDOCRINOLOGY | Age: 71
End: 2021-02-19
Payer: MEDICARE

## 2021-02-19 DIAGNOSIS — R76.0 ELEVATED ANTIBODY LEVELS: Primary | ICD-10-CM

## 2021-02-19 PROCEDURE — 99204 OFFICE O/P NEW MOD 45 MIN: CPT | Performed by: INTERNAL MEDICINE

## 2021-02-19 NOTE — PROGRESS NOTES
Chief Complaint   Patient presents with    New Patient     [email or web address]    Abnormal Lab Results     Abnormal thyroid labs     Other     [facility]     History of Present Illness: Kristen Zarate is a 73 y.o. female with a past medical hx significant for HTN and IFG presenting in referral from [address] [place] [address], Bienvenido Walsh MD for discussion related to abnormal thyroid labs. TPO Ab were assessed by Meron Cronin MD 1 patient's brother reported to her that he had thyroid cancer on December 25th, underwent surgery in January. He described that he did not feel any symptoms prior to the diagnosis, in particular no voice changes. There have unfortunately been 3 deaths in 3 years. TPO Ab was 238. TSH and free T4 were reportedly normal no hx of dysphagia, lumps in the neck or thyromegaly. Does take biotin 180mcg, 30mcg in supplemental forms and was taking this at the time of thyroid function tests were assessed. Is taking vitamin D 2000IU, 400IU of E daily. Past Medical History:   Diagnosis Date    Hypertension     Prediabetes      Past Surgical History:   Procedure Laterality Date    HX HYSTERECTOMY  38    HX ORTHOPAEDIC  2-2012    right index finger     Current Outpatient Medications   Medication Sig    olmesartan-hydroCHLOROthiazide (BENICAR HCT) 20-12.5 mg per tablet TAKE 1 TABLET BY MOUTH EVERY DAY. NEED APPOINTMENT    vitamin e (E GEMS) 100 unit capsule Take  by mouth daily.  ascorbic acid, vitamin C, (VITAMIN C) 250 mg tablet Take  by mouth.  cholecalciferol (VITAMIN D3) 1,000 unit cap Take  by mouth daily.  aspirin 81 mg chewable tablet Take 81 mg by mouth daily.  multivitamin (ONE A DAY) tablet Take 1 Tab by mouth daily.  Biotin 2,500 mcg cap Take  by mouth. No current facility-administered medications for this visit.       No Known Allergies  Family History   Problem Relation Age of Onset    Diabetes Mother     Hypertension Mother     Heart Disease Mother     Cancer Father    RyleighNella Hypertension Father     Diabetes Brother     Hypertension Brother     Cancer Brother     Diabetes Brother     Hypertension Brother     Hypertension Brother     Thyroid Cancer Brother    mother had hypothyroidism   Brother with thyroid ca    Social Hx:     Review of Systems:  - See HPI    - Physical Examination:  - - GENERAL: NCAT, Appears well nourished   - EYES: EOMI, non-icteric, no proptosis   - Ear/Nose/Throat: NCAT, no visible inflammation or masses   - CARDIOVASCULAR: no cyanosis, no visible JVD   - RESPIRATORY: respiratory effort normal without any distress or labored breathing   - MUSCULOSKELETAL: Normal ROM of neck and upper extremities observed   - SKIN: No rash on face  - NEUROLOGIC:  No facial asymmetry (Cranial nerve 7 motor function), No gaze palsy   - PSYCHIATRIC: Normal affect, Normal insight and judgement     Data Reviewed:       Assessment/Plan: This is a very pleasant 28-year-old female with a past medical history significant for hypertension presenting in referral from [address] [place] [address], Pippa Chavez MD for discussion related to elevated thyroperoxidase antibodies. She recently was informed that her brother unfortunately has thyroid cancer and he is undergoing treatment for that. I explained to Janeth Lesliewell the fact that thyroperoxidase antibodies increase the risk of Hashimoto's hypothyroidism but do not increase the risk of thyroid cancer. She currently does not have any thyroid nodules on examination or palpation and denies dysphagia or dyspnea when recumbent. I explained that it would be helpful for her primary care provider to reassess her thyroid function tests every 6 months to 1 year to ensure she is not developing Hashimoto's hypothyroidism. She should hold her biotin at least 3 days prior to the laboratory assessment of the thyroid function.     #Elevated thyroperoxidase antibodies  -Portends increased risk of Hashimoto's hypothyroidism, patient's mother did have Hashimoto's hypothyroidism as well  -Positive thyroperoxidase antibodies do not increase the risk of thyroid cancer  -Reassess thyroid function tests on a every 6 to 12-month basis with Marilee Del Valle MD, please hold biotin for 3 days prior to the laboratory assessment  -Thyroid ultrasound is not warranted at this time given absence of palpable thyroid nodules/compressive symptomatology    Copy sent to:Liz Dahl MD     Return to care as needed      Albertina Sherman is a 73 y.o. female being evaluated by a Virtual Visit (video visit) encounter to address concerns as mentioned above. A caregiver was present when appropriate. Due to this being a TeleHealth encounter (During [facility]-88 public health emergency), evaluation of the following organ systems was limited:     Vitals/Constitutional/EENT/Resp/CV/GI//MS/Neuro/Skin/Heme-Lymph-Imm. Pursuant to the emergency declaration under the [address] [place] [address], [address] [place] [address] authority and the [employer] Act, this Virtual Visit was conducted with patient's (and/or legal guardian's) consent, to reduce the risk of exposure to COVID-19 and provide necessary medical care. Services were provided through a video synchronous discussion virtually to substitute for in-person encounter. --Joao Bruce MD on 2/19/2021 at 1:29 PM    An electronic signature was used to authenticate this note.

## 2021-06-14 ENCOUNTER — OFFICE VISIT (OUTPATIENT)
Dept: PRIMARY CARE CLINIC | Age: 71
End: 2021-06-14
Payer: MEDICARE

## 2021-06-14 ENCOUNTER — HOSPITAL ENCOUNTER (OUTPATIENT)
Dept: GENERAL RADIOLOGY | Age: 71
Discharge: HOME | End: 2021-06-14
Payer: MEDICARE

## 2021-06-14 VITALS
HEART RATE: 60 BPM | SYSTOLIC BLOOD PRESSURE: 131 MMHG | BODY MASS INDEX: 26.24 KG/M2 | WEIGHT: 167.2 LBS | OXYGEN SATURATION: 100 % | TEMPERATURE: 97.1 F | DIASTOLIC BLOOD PRESSURE: 73 MMHG | RESPIRATION RATE: 16 BRPM | HEIGHT: 67 IN

## 2021-06-14 DIAGNOSIS — M25.561 ACUTE PAIN OF RIGHT KNEE: Primary | ICD-10-CM

## 2021-06-14 DIAGNOSIS — I10 ESSENTIAL HYPERTENSION: ICD-10-CM

## 2021-06-14 DIAGNOSIS — M25.561 ACUTE PAIN OF RIGHT KNEE: ICD-10-CM

## 2021-06-14 PROCEDURE — 73562 X-RAY EXAM OF KNEE 3: CPT

## 2021-06-14 PROCEDURE — 3017F COLORECTAL CA SCREEN DOC REV: CPT | Performed by: FAMILY MEDICINE

## 2021-06-14 PROCEDURE — 1090F PRES/ABSN URINE INCON ASSESS: CPT | Performed by: FAMILY MEDICINE

## 2021-06-14 PROCEDURE — G8399 PT W/DXA RESULTS DOCUMENT: HCPCS | Performed by: FAMILY MEDICINE

## 2021-06-14 PROCEDURE — G8432 DEP SCR NOT DOC, RNG: HCPCS | Performed by: FAMILY MEDICINE

## 2021-06-14 PROCEDURE — G8752 SYS BP LESS 140: HCPCS | Performed by: FAMILY MEDICINE

## 2021-06-14 PROCEDURE — 99214 OFFICE O/P EST MOD 30 MIN: CPT | Performed by: FAMILY MEDICINE

## 2021-06-14 PROCEDURE — 1101F PT FALLS ASSESS-DOCD LE1/YR: CPT | Performed by: FAMILY MEDICINE

## 2021-06-14 PROCEDURE — G8427 DOCREV CUR MEDS BY ELIG CLIN: HCPCS | Performed by: FAMILY MEDICINE

## 2021-06-14 PROCEDURE — G8419 CALC BMI OUT NRM PARAM NOF/U: HCPCS | Performed by: FAMILY MEDICINE

## 2021-06-14 PROCEDURE — G8754 DIAS BP LESS 90: HCPCS | Performed by: FAMILY MEDICINE

## 2021-06-14 PROCEDURE — G9899 SCRN MAM PERF RSLTS DOC: HCPCS | Performed by: FAMILY MEDICINE

## 2021-06-14 PROCEDURE — G8536 NO DOC ELDER MAL SCRN: HCPCS | Performed by: FAMILY MEDICINE

## 2021-06-14 RX ORDER — NAPROXEN 500 MG/1
500 TABLET ORAL 2 TIMES DAILY WITH MEALS
Qty: 20 TABLET | Refills: 0 | Status: SHIPPED | OUTPATIENT
Start: 2021-06-14 | End: 2021-10-01

## 2021-06-14 RX ORDER — OLMESARTAN MEDOXOMIL AND HYDROCHLOROTHIAZIDE 20/12.5 20; 12.5 MG/1; MG/1
TABLET ORAL
Qty: 90 TABLET | Refills: 0 | Status: SHIPPED | OUTPATIENT
Start: 2021-06-14 | End: 2021-09-24

## 2021-06-14 NOTE — PROGRESS NOTES
Subjective:     Chief Complaint   Patient presents with    Leg Pain     R leg pain knee's down - stiffness  , was both but L is ok now has been about 1 month     Hypertension        She  is a 73y.o. year old female with hx of HTN who presents for evaluation of  Right knee pain and follow up on blood pressure. She is here after a year. Patient reports that about a month ago she accidentally bumped into a chair and injured her right knee. It was swollen slightly but it resolved. Knee has been sore since then. Knee feels tight and stiff if she sits for a long period  and pain gets worse when she stand up from sitting position. Reports that symptoms are much better compare to a month ago. BP well controlled with Benicar. Pertinent items are noted in HPI. Objective:     Vitals:    06/14/21 0935   BP: 131/73   Pulse: 60   Resp: 16   Temp: 97.1 °F (36.2 °C)   TempSrc: Temporal   SpO2: 100%   Weight: 167 lb 3.2 oz (75.8 kg)   Height: 5' 6.5\" (1.689 m)       Physical Examination: General appearance - alert, well appearing, and in no distress, oriented to person, place, and time and overweight  Mental status - alert, oriented to person, place, and time, normal mood, behavior, speech, dress, motor activity, and thought processes  Chest - clear to auscultation, no wheezes, rales or rhonchi, symmetric air entry  Heart - normal rate, regular rhythm, normal S1, S2, no murmurs, rubs, clicks or gallops  Musculoskeletal - no  deformity or swelling in both knees. Mild TTP over lower part of the medial joint line of right knee.  Eversion was slightly restricted otherwise normal.      No Known Allergies   Social History     Socioeconomic History    Marital status: SINGLE     Spouse name: Not on file    Number of children: Not on file    Years of education: Not on file    Highest education level: Not on file   Tobacco Use    Smoking status: Never Smoker    Smokeless tobacco: Never Used   Vaping Use    Vaping Use: Never used   Substance and Sexual Activity    Alcohol use: No    Drug use: No    Sexual activity: Never     Social Determinants of Health     Financial Resource Strain:     Difficulty of Paying Living Expenses:    Food Insecurity:     Worried About Running Out of Food in the Last Year:     [address] in the Last Year:    Transportation Needs:     Lack of Transportation (Medical):  Lack of Transportation (Non-Medical):    Physical Activity:     Days of Exercise per Week:     Minutes of Exercise per Session:    Stress:     Feeling of Stress :    Social Connections:     Frequency of Communication with Friends and Family:     Frequency of Social Gatherings with Friends and Family:     Attends [employer] Services:     Active Member of Clubs or Organizations:     Attends Club or Organization Meetings:     Marital Status:       Family History   Problem Relation Age of Onset    Diabetes Mother     Hypertension Mother     Heart Disease Mother     Cancer Father     Hypertension Father     Diabetes Brother     Hypertension Brother     Cancer Brother     Diabetes Brother     Hypertension Brother     Hypertension Brother     Thyroid Cancer Brother       Past Surgical History:   Procedure Laterality Date    HX HYSTERECTOMY  1980    HX ORTHOPAEDIC  2-2012    right index finger      Past Medical History:   Diagnosis Date    Hypertension     Prediabetes       Current Outpatient Medications   Medication Sig Dispense Refill    olmesartan-hydroCHLOROthiazide (BENICAR HCT) 20-12.5 mg per tablet TAKE 1 TABLET BY MOUTH EVERY DAY. NEED APPOINTMENT 90 Tab 0    vitamin e (E GEMS) 100 unit capsule Take  by mouth daily.  ascorbic acid, vitamin C, (VITAMIN C) 250 mg tablet Take  by mouth.  cholecalciferol (VITAMIN D3) 1,000 unit cap Take  by mouth daily.  aspirin 81 mg chewable tablet Take 81 mg by mouth daily.  Biotin 2,500 mcg cap Take  by mouth.       multivitamin (ONE A DAY) tablet Take 1 Tab by mouth daily. Assessment/ Plan:   Diagnoses and all orders for this visit:    1. Acute pain of right knee  -     XR KNEE RT 3 V; Future- Xray is normal.  -    Start  naproxen (NAPROSYN) 500 mg tablet; Take 1 Tablet by mouth two (2) times daily (with meals). 2. Essential hypertension  -     METABOLIC PANEL, BASIC; Future  -   BP well controlled with current med. olmesartan-hydroCHLOROthiazide (BENICAR HCT) 20-12.5 mg per tablet; TAKE 1 TABLET BY MOUTH EVERY DAY. Medication risks/benefits/costs/interactions/alternatives discussed with patient. Advised patient to call back or return to office if symptoms worsen/change/persist. If patient cannot reach us or should anything more severe/urgent arise he/she should proceed directly to the nearest emergency department. Discussed expected course/resolution/complications of diagnosis in detail with patient. Patient given a written after visit summary which includes her diagnoses, current medications and vitals. Patient expressed understanding with the diagnosis and plan. Follow-up and Dispositions    · Return in about 1 month (around 7/14/2021), or if symptoms worsen or fail to improve, for medicare well ness.

## 2021-06-14 NOTE — PROGRESS NOTES
Identified pt with two pt identifiers(name and ). Chief Complaint   Patient presents with    Leg Pain     R leg pain knee's down - stiffness  , was both but L is ok now has been about 1 month         3 most recent PHQ Screens 6/15/2020   Little interest or pleasure in doing things Not at all   Feeling down, depressed, irritable, or hopeless Not at all   Total Score PHQ 2 0        Vitals:    21 0935   Weight: 167 lb 3.2 oz (75.8 kg)   Height: 5' 6.5\" (1.689 m)       Health Maintenance Due   Topic    Shingrix Vaccine Age 50> (1 of 2)    Colorectal Cancer Screening Combo     Medicare Yearly Exam     A1C test (Diabetic or Prediabetic)        1. Have you been to the ER, urgent care clinic since your last visit? Hospitalized since your last visit? No    2. Have you seen or consulted any other health care providers outside of the [address] [place] [address] since your last visit? Include any pap smears or colon screening.  No

## 2021-09-24 DIAGNOSIS — I10 ESSENTIAL HYPERTENSION: ICD-10-CM

## 2021-09-24 RX ORDER — OLMESARTAN MEDOXOMIL AND HYDROCHLOROTHIAZIDE 20/12.5 20; 12.5 MG/1; MG/1
TABLET ORAL
Qty: 90 TABLET | Refills: 0 | Status: SHIPPED | OUTPATIENT
Start: 2021-09-24 | End: 2021-12-23

## 2021-10-01 DIAGNOSIS — M25.561 ACUTE PAIN OF RIGHT KNEE: ICD-10-CM

## 2021-10-01 RX ORDER — NAPROXEN 500 MG/1
500 TABLET ORAL 2 TIMES DAILY WITH MEALS
Qty: 20 TABLET | Refills: 0 | Status: SHIPPED | OUTPATIENT
Start: 2021-10-01 | End: 2022-07-11 | Stop reason: ALTCHOICE

## 2021-12-23 DIAGNOSIS — I10 ESSENTIAL HYPERTENSION: ICD-10-CM

## 2021-12-23 RX ORDER — OLMESARTAN MEDOXOMIL AND HYDROCHLOROTHIAZIDE 20/12.5 20; 12.5 MG/1; MG/1
TABLET ORAL
Qty: 90 TABLET | Refills: 0 | Status: SHIPPED | OUTPATIENT
Start: 2021-12-23 | End: 2022-04-26 | Stop reason: SDUPTHER

## 2022-01-10 ENCOUNTER — TRANSCRIBE ORDER (OUTPATIENT)
Dept: SCHEDULING | Age: 72
End: 2022-01-10

## 2022-01-10 DIAGNOSIS — Z12.31 VISIT FOR SCREENING MAMMOGRAM: Primary | ICD-10-CM

## 2022-03-19 PROBLEM — Z78.9 ADVANCE DIRECTIVE IN CHART: Status: ACTIVE | Noted: 2018-11-12

## 2022-04-11 ENCOUNTER — TELEPHONE (OUTPATIENT)
Dept: PRIMARY CARE CLINIC | Age: 72
End: 2022-04-11

## 2022-04-26 DIAGNOSIS — I10 ESSENTIAL HYPERTENSION: ICD-10-CM

## 2022-04-26 DIAGNOSIS — Z79.899 MEDICATION MANAGEMENT: Primary | ICD-10-CM

## 2022-04-26 RX ORDER — OLMESARTAN MEDOXOMIL AND HYDROCHLOROTHIAZIDE 20/12.5 20; 12.5 MG/1; MG/1
1 TABLET ORAL DAILY
Qty: 30 TABLET | Refills: 0 | Status: SHIPPED | OUTPATIENT
Start: 2022-04-26 | End: 2022-05-15

## 2022-05-14 DIAGNOSIS — I10 ESSENTIAL HYPERTENSION: ICD-10-CM

## 2022-05-15 RX ORDER — OLMESARTAN MEDOXOMIL AND HYDROCHLOROTHIAZIDE 20/12.5 20; 12.5 MG/1; MG/1
TABLET ORAL
Qty: 30 TABLET | Refills: 0 | Status: SHIPPED | OUTPATIENT
Start: 2022-05-15 | End: 2022-06-10 | Stop reason: SDUPTHER

## 2022-07-11 ENCOUNTER — HOSPITAL ENCOUNTER (OUTPATIENT)
Dept: MAMMOGRAPHY | Age: 72
Discharge: HOME | End: 2022-07-11
Attending: FAMILY MEDICINE
Payer: MEDICARE

## 2022-07-11 ENCOUNTER — OFFICE VISIT (OUTPATIENT)
Dept: PRIMARY CARE CLINIC | Age: 72
End: 2022-07-11
Payer: MEDICARE

## 2022-07-11 VITALS
HEIGHT: 67 IN | RESPIRATION RATE: 18 BRPM | TEMPERATURE: 98.3 F | OXYGEN SATURATION: 100 % | BODY MASS INDEX: 25.21 KG/M2 | WEIGHT: 160.6 LBS | DIASTOLIC BLOOD PRESSURE: 74 MMHG | SYSTOLIC BLOOD PRESSURE: 121 MMHG | HEART RATE: 60 BPM

## 2022-07-11 DIAGNOSIS — Z78.0 POST-MENOPAUSAL: ICD-10-CM

## 2022-07-11 DIAGNOSIS — Z13.6 ENCOUNTER FOR LIPID SCREENING FOR CARDIOVASCULAR DISEASE: ICD-10-CM

## 2022-07-11 DIAGNOSIS — R73.03 PREDIABETES: ICD-10-CM

## 2022-07-11 DIAGNOSIS — Z12.31 VISIT FOR SCREENING MAMMOGRAM: ICD-10-CM

## 2022-07-11 DIAGNOSIS — Z00.00 MEDICARE ANNUAL WELLNESS VISIT, SUBSEQUENT: Primary | ICD-10-CM

## 2022-07-11 DIAGNOSIS — Z13.220 ENCOUNTER FOR LIPID SCREENING FOR CARDIOVASCULAR DISEASE: ICD-10-CM

## 2022-07-11 PROCEDURE — 1101F PT FALLS ASSESS-DOCD LE1/YR: CPT | Performed by: FAMILY MEDICINE

## 2022-07-11 PROCEDURE — G8754 DIAS BP LESS 90: HCPCS | Performed by: FAMILY MEDICINE

## 2022-07-11 PROCEDURE — 77067 SCR MAMMO BI INCL CAD: CPT

## 2022-07-11 PROCEDURE — G8399 PT W/DXA RESULTS DOCUMENT: HCPCS | Performed by: FAMILY MEDICINE

## 2022-07-11 PROCEDURE — G8510 SCR DEP NEG, NO PLAN REQD: HCPCS | Performed by: FAMILY MEDICINE

## 2022-07-11 PROCEDURE — 3017F COLORECTAL CA SCREEN DOC REV: CPT | Performed by: FAMILY MEDICINE

## 2022-07-11 PROCEDURE — 1123F ACP DISCUSS/DSCN MKR DOCD: CPT | Performed by: FAMILY MEDICINE

## 2022-07-11 PROCEDURE — G8536 NO DOC ELDER MAL SCRN: HCPCS | Performed by: FAMILY MEDICINE

## 2022-07-11 PROCEDURE — G8417 CALC BMI ABV UP PARAM F/U: HCPCS | Performed by: FAMILY MEDICINE

## 2022-07-11 PROCEDURE — G0439 PPPS, SUBSEQ VISIT: HCPCS | Performed by: FAMILY MEDICINE

## 2022-07-11 PROCEDURE — G9899 SCRN MAM PERF RSLTS DOC: HCPCS | Performed by: FAMILY MEDICINE

## 2022-07-11 PROCEDURE — G8752 SYS BP LESS 140: HCPCS | Performed by: FAMILY MEDICINE

## 2022-07-11 PROCEDURE — G8427 DOCREV CUR MEDS BY ELIG CLIN: HCPCS | Performed by: FAMILY MEDICINE

## 2022-07-11 NOTE — PROGRESS NOTES
[facility] [address] [facility], [address] [facility]    Date of visit: 7/11/2022       This is an Subsequent Medicare Annual Wellness Visit (AWV), (Performed more than 12 months after effective date of Medicare Part B enrollment and 12 months after last preventive visit, Once in a lifetime)    I have reviewed the patient's medical history in detail and updated the computerized patient record. Brianna Larson is a 81 y.o. female   History obtained from: the patient. Concerns today   No acute concerns. History     Patient Active Problem List   Diagnosis Code    Essential hypertension I10    Post-menopausal Z78.0    Prediabetes R73.03    Presbyopia H52.4    Advance directive in chart Z78.9     Past Medical History:   Diagnosis Date    Hypertension     Prediabetes       Past Surgical History:   Procedure Laterality Date    HX HYSTERECTOMY  38    HX ORTHOPAEDIC  2-2012    right index finger     No Known Allergies  Current Outpatient Medications   Medication Sig Dispense Refill    olmesartan-hydroCHLOROthiazide (BENICAR HCT) 20-12.5 mg per tablet TAKE 1 TABLET BY MOUTH EVERY DAY 90 Tablet 0    vitamin e (E GEMS) 100 unit capsule Take  by mouth daily.  ascorbic acid, vitamin C, (VITAMIN C) 250 mg tablet Take  by mouth.  cholecalciferol (VITAMIN D3) 1,000 unit cap Take  by mouth daily.  aspirin 81 mg chewable tablet Take 81 mg by mouth daily.  Biotin 2,500 mcg cap Take  by mouth.  multivitamin (ONE A DAY) tablet Take 1 Tab by mouth daily.        Family History   Problem Relation Age of Onset    Diabetes Mother     Hypertension Mother     Heart Disease Mother     Cancer Father     Hypertension Father     Diabetes Brother     Hypertension Brother     Cancer Brother     Diabetes Brother     Hypertension Brother     Hypertension Brother     Thyroid Cancer Brother      Social History     Tobacco Use    Smoking status: Never Smoker    Smokeless tobacco: Never Used   Substance Use Topics    Alcohol use: No       Specialists/Care Team   Kalpana Brink has established care with the following healthcare providers:  Patient Care Team:  Kathryn Gongora DO as PCP - General (Family Medicine)  Shelby Alcaraz MD as PCP - [facility] Empaneled Provider  Shon Sauceda MD as Consulting Provider (Internal Medicine Physician)    Review of Systems (if indicated for problems addressed today)   Denies fever, chills, sore throat, cough    Physical Examination     Vitals:    07/11/22 0839   BP: 121/74   Pulse: 60   Resp: 18   Temp: 98.3 °F (36.8 °C)   TempSrc: Oral   SpO2: 100%   Weight: 160 lb 9.6 oz (72.8 kg)   Height: 5' 6.5\" (1.689 m)     Body mass index is 25.53 kg/m². No exam data present  Was the patient's timed Up & Go test unsteady or longer than 30 seconds? no    Evaluation of Cognitive Function   Mood/affect:  neutral  Orientation: Person, Place and Time  Appearance: age appropriate  Family member/caregiver input: not present    Additional exam if indicated for problems addressed today:  WNWD, NAD  RRR, no murmurs  CTA, no wheezes/ ronchi/ rales  abd soft, nttp  Patellar reflexes 2+ BL  No cervical LAD    Advice/Referrals/Counseling (as indicated)   Education and counseling provided for any problems identified above: DEXA scan    Preventive Services     Discussed today Done Previously Not Needed     x  Pneumococcal vaccines     x Flu vaccine     x Hepatitis B vaccine (if at risk)    x  Shingles vaccine    x  TDAP vaccine   X scheduled today   Mammogram     x Pap smear    X [address] [place] [address]  Colorectal cancer screening     x Low-dose CT for lung cancer screening   x   Bone density test   X [facility]   Glaucoma screening   x   Cholesterol test   x   Diabetes screening test      x Diabetes self-management class     x Nutritionist referral for diabetes or renal disease     Discussion of Advance Directive   Discussed with Wilbert Ramos.  Saray Brink her ability to prepare and advance directive in the case that an injury or illness causes her to be unable to make health care decisions. Has advanced care plan on file. Would want both children to be primary decision makers. Assessment/Plan       ICD-10-CM ICD-9-CM    1. Medicare annual wellness visit, subsequent  C37.49 R49.3 METABOLIC PANEL, COMPREHENSIVE      CBC W/O DIFF   2. Post-menopausal  Z78.0 V49.81 DEXA BONE DENSITY STUDY AXIAL   3. Encounter for lipid screening for cardiovascular disease  Z13.220 V77.91 LIPID PANEL    Z13.6 V81.2    4.  Prediabetes  R73.03 790.29 HEMOGLOBIN A1C WITH EAG       Orders Placed This Encounter    DEXA BONE DENSITY STUDY AXIAL    METABOLIC PANEL, COMPREHENSIVE    HEMOGLOBIN A1C WITH EAG    CBC W/O DIFF    LIPID PANEL     Juan Carlos Peña DO

## 2022-07-11 NOTE — PROGRESS NOTES
Medicare [address] Mouna Mancilla is a 81 y.o. female and presents for Annual Medicare Wellness Visit. Vitals:    07/11/22 0839   BP: 121/74   Pulse: 60   Resp: 18   Temp: 98.3 °F (36.8 °C)   TempSrc: Oral   SpO2: 100%   Weight: 160 lb 9.6 oz (72.8 kg)   Height: 5' 6.5\" (1.689 m)       Health Maintenance Due   Topic Date Due    Colorectal Cancer Screening Combo  10/13/2018    Medicare Yearly Exam  06/16/2021    A1C test (Diabetic or Prediabetic)  06/17/2021    COVID-19 Vaccine (3 - Booster for Moderna series) 09/19/2021    Bone Densitometry  06/22/2022        1. Have you been to the ER, urgent care clinic since your last visit? Hospitalized since your last visit? No    2. Have you seen or consulted any other health care providers outside of the [address] [place] [address] since your last visit? Include any pap smears or colon screening. No    Abuse Screen:   Abuse Screening Questionnaire 7/11/2022   Do you ever feel afraid of your partner? N   Are you in a relationship with someone who physically or mentally threatens you? N   Is it safe for you to go home? Y       Alcohol Risk Screen:   On any occasion during the past 3 months, have you had more than 3 drinks(female) or 4 drinks (male) containing alcohol in one? No  Do you average more than 7 drinks (female) or 14 drinks (male) per week? No  Type and amount:n/a      Hearing Loss:  Hearing is good. denies any hearing loss wears hearing aides    Vision Loss:   Wears glasses, contact lenses, or have any other visual impairment  no    During the past 4 weeks     How would you rate your health in general ? Good  How often have you been bothered by feeling dizzy when standing up? Almost never  How much have you been bothered by bodily pain? Almost never    Have you noticed any hearing difficulties? No  Has your physical and emotional health limited your social activities with family or friends?  No    Emotional Health Questions  Do you have a history of depression, anxiety, or emotional problems? No  3 most recent PHQ Screens 7/11/2022   Little interest or pleasure in doing things Not at all   Feeling down, depressed, irritable, or hopeless Not at all   Total Score PHQ 2 0   Trouble falling or staying asleep, or sleeping too much Not at all   Feeling tired or having little energy Not at all   Poor appetite, weight loss, or overeating Not at all   Feeling bad about yourself - or that you are a failure or have let yourself or your family down Not at all   Trouble concentrating on things such as school, work, reading, or watching TV Not at all   Moving or speaking so slowly that other people could have noticed; or the opposite being so fidgety that others notice Not at all   Thoughts of being better off dead, or hurting yourself in some way Not at all   PHQ 9 Score 0         Health Habits    Please describe your diet habits: normal    Do you get 5 servings of fruits or vegetables daily? Yes  Do you exercise regularly? Yes    Activities of Daily Living and Functional Status    ADL Assessment 7/11/2022   Feeding yourself No Help Needed   Getting from bed to chair No Help Needed   Getting dressed No Help Needed   Bathing or showering No Help Needed   Walk across the room (includes cane/walker) No Help Needed   Using the telphone No Help Needed   Taking your medications No Help Needed   Preparing meals No Help Needed   Managing money (expenses/bills) No Help Needed   Moderately strenuous housework (laundry) No Help Needed   Shopping for personal items (toiletries/medicines) No Help Needed   Shopping for groceries No Help Needed   Driving No Help Needed   Climbing a flight of stairs No Help Needed   Getting to places beyond walking distances No Help Needed       If yes, explain:    In the past four weeks, was someone available to help you if you needed and wanted   help with anything?  Yes    Are you confident that you can control and manage most of your health concerns? Yes    Have you been given information to help you keep track of your medications? Yes    How often do you have trouble taking your medications as prescribed? Almost never    Fall Risk and Home Safety  Fall Risk Assessment, last 12 mths 7/11/2022   Able to walk? Yes   Fall in past 12 months? 0   Do you feel unsteady? 0   Are you worried about falling 0       Does your home have:    Rugs in the hallway? No  Grab bars in the bathroom? No    Handrails on the stairs? Yes  Poor lighting? No  Do you have smoke detectors, and do you check them regularly? Yes  Do you have difficulties driving a car? No  Do you always fasten your seatbelt when you are in a car? Yes    Preventative Health       Health Maintenance Topics with due status: Overdue       Topic Date Due    Colorectal Cancer Screening Combo 10/13/2018    Medicare Yearly Exam 06/16/2021    A1C test (Diabetic or Prediabetic) 06/17/2021    COVID-19 Vaccine 09/19/2021    Bone Densitometry 06/22/2022     Health Maintenance Topics with due status: Not Due       Topic Last Completion Date    Lipid Screen 11/12/2018    DTaP/Tdap/Td series 05/09/2019    Breast Cancer Screen Mammogram 01/22/2021    Depression Screen 07/11/2022    Flu Vaccine Not Due     Health Maintenance Topics with due status: Completed       Topic Last Completion Date    Hepatitis C Screening 10/30/2017    Pneumococcal 65+ years 11/12/2018    Bone Densitometry (Dexa) Screening 06/22/2020    Shingrix Vaccine Age 50> 09/22/2020        Do you have a family history of colon cancer? No    Vaccines:  Immunization status: up to date and documented. If you have a smoking history, please fill out: How long have you been smoking, or did you smoke for (years)? How many packs per day for most of those years? If you have quit, when did you quit (year)?         FOR FEMALES:  When was your last PAP and what were the results normal  When was your last mammogram?      Do you have a family history of breast cancer? No  If you are > 61years old, have you had a DEXA or bone density scan?  Yes

## 2022-07-11 NOTE — PATIENT INSTRUCTIONS
Someone viviane call you within the next 2 business days to schedule your imaging. They will contact you from [phone]. If you do not hear from scheduling within 48 hours please contact central scheduling at [phone] to schedule your imaging. Well Visit, Over 72: Care Instructions  Overview     Well visits can help you stay healthy. Your doctor has checked your overall health and may have suggested ways to take good care of yourself. Your doctor also may have recommended tests. At home, you can help prevent illness with healthy eating, regular exercise, and other steps. Follow-up care is a key part of your treatment and safety. Be sure to make and go to all appointments, and call your doctor if you are having problems. It's also a good idea to know your test results and keep a list of the medicines you take. How can you care for yourself at home? · Get screening tests that you and your doctor decide on. Screening helps find diseases before any symptoms appear. · Eat healthy foods. Choose fruits, vegetables, whole grains, protein, and low-fat dairy foods. Limit fat, especially saturated fat. Reduce salt in your diet. · Limit alcohol. If you are a man, have no more than 2 drinks a day or 14 drinks a week. If you are a woman, have no more than 1 drink a day or 7 drinks a week. Since alcohol affects older adults differently, you may want to limit alcohol even more. Or you may not want to drink at all. · Get at least 30 minutes of exercise on most days of the week. Walking is a good choice. You also may want to do other activities, such as running, swimming, cycling, or playing tennis or team sports. · Reach and stay at a healthy weight. This will lower your risk for many problems, such as obesity, diabetes, heart disease, and high blood pressure. · Do not smoke. Smoking can make health problems worse. If you need help quitting, talk to your doctor about stop-smoking programs and medicines.  These can increase your chances of quitting for good. · Care for your mental health. It is easy to get weighed down by worry and stress. Learn strategies to manage stress, like deep breathing and mindfulness, and stay connected with your family and community. If you find you often feel sad or hopeless, talk with your doctor. Treatment can help. · Talk to your doctor about whether you have any risk factors for sexually transmitted infections (STIs). You can help prevent STIs if you wait to have sex with a new partner (or partners) until you've each been tested for STIs. It also helps if you use condoms (male or female condoms) and if you limit your sex partners to one person who only has sex with you. Vaccines are available for some STIs. · If you think you may have a problem with alcohol or drug use, talk to your doctor. This includes prescription medicines (such as amphetamines and opioids) and illegal drugs (such as cocaine and methamphetamine). Your doctor can help you figure out what type of treatment is best for you. · Protect your skin from too much sun. When you're outdoors from 10 a.m. to 4 p.m., stay in the shade or cover up with clothing and a hat with a wide brim. Wear sunglasses that block UV rays. Even when it's cloudy, put broad-spectrum sunscreen (SPF 30 or higher) on any exposed skin. · See a dentist one or two times a year for checkups and to have your teeth cleaned. · Wear a seat belt in the car. When should you call for help? Watch closely for changes in your health, and be sure to contact your doctor if you have any problems or symptoms that concern you. Where can you learn more? Go to http://www.gray.com/  Enter [id number] in the search box to learn more about \"Well Visit, Over 65: Care Instructions. \"  Current as of: October 6, 2021               Content Version: 13.2  © 0358-8842 [employer].    Care instructions adapted under license by [facility] (which disclaims liability or warranty for this information).  If you have questions about a medical condition or this instruction, always ask your healthcare professional. [address] any warranty or liability for your use of this information.

## 2022-07-18 ENCOUNTER — HOSPITAL ENCOUNTER (OUTPATIENT)
Dept: MAMMOGRAPHY | Age: 72
Discharge: HOME | End: 2022-07-18
Attending: FAMILY MEDICINE
Payer: MEDICARE

## 2022-07-18 DIAGNOSIS — Z78.0 POST-MENOPAUSAL: ICD-10-CM

## 2022-07-18 PROCEDURE — 77080 DXA BONE DENSITY AXIAL: CPT

## 2022-07-26 NOTE — PROGRESS NOTES
[address]        NAME: Zehra Gonzalez is a 34 y o  female  : 1991    MRN: [id number]  DATE: 2021  TIME: 9:35 AM    Assessment and Plan   Acute tonsillitis, unspecified etiology [J03 90]  1  Acute tonsillitis, unspecified etiology  POCT rapid strepA    Throat culture    predniSONE 20 mg tablet     Rapid strep negative  Throat culture sent  Prednisone for swelling  Follow up with ENT  Patient Instructions   Patient Instructions     Tonsillitis   WHAT YOU NEED TO KNOW:   Tonsillitis is inflammation of your tonsils  Tonsils are the lumps of tissue on both sides of the back of your throat  Tonsils are part of your immune system  They help you fight infections  Recurrent tonsillitis is when you have tonsillitis many times in 1 year  Chronic tonsillitis is when you have a sore throat that lasts 3 months or longer  DISCHARGE INSTRUCTIONS:   Medicines: You may need any of the following:  · Acetaminophen  decreases pain and fever  It is available without a doctor's order  Ask how much to take and how often to take it  Follow directions  Acetaminophen can cause liver damage if not taken correctly  · NSAIDs , such as ibuprofen, help decrease swelling, pain, and fever  This medicine is available with or without a doctor's order  NSAIDs can cause stomach bleeding or kidney problems in certain people  If you take blood thinner medicine, always ask your healthcare provider if NSAIDs are safe for you  Always read the medicine label and follow directions  · Antibiotics  help treat a bacterial infection  · Take your medicine as directed  Contact your healthcare provider if you think your medicine is not helping or if you have side effects  Tell him or her if you are allergic to any medicine  Keep a list of the medicines, vitamins, and herbs you take  Include the amounts, and when and why you take them  Bring the list or the pill bottles to follow-up visits   Carry your medicine Letter mailed list with you in case of an emergency  Call 911 for the following:   · You have trouble breathing because your tonsils are swollen  Contact your healthcare provider if:   · You have a fever  · Your pain gets worse or does not get better after you take pain medicine  · Your sore throat is not better after you have finished antibiotic treatment  · You have trouble sleeping and wake up trying to catch your breath  · You have questions or concerns about your condition or care  Rest  when you feel it is needed  Slowly start to do more each day  Return to your daily activities as directed  Drink liquids as directed: You may need to drink more liquid than usual to help prevent dehydration  Ask how much liquid to drink each day and which liquids are best for you  Gargle with warm salt water: This may help decrease throat pain  Mix 1 teaspoon of salt in 8 ounces of warm water  Ask how often you should do this  Prevent the spread of germs:  Wash your hands often  Do not share food or drinks with anyone  You may be able to return to work when you feel better and your fever is gone for at least 24 hours  Follow up with your healthcare provider as directed:  Write down your questions so you remember to ask them during your visits  © Copyright [address] Information is for End User's use only and may not be sold, redistributed or otherwise used for commercial purposes  All illustrations and images included in CareNotes® are the copyrighted property of [employer]  or [facility] Cesar Martin   The above information is an  only  It is not intended as medical advice for individual conditions or treatments  Talk to your doctor, nurse or pharmacist before following any medical regimen to see if it is safe and effective for you  Proceed to  ER if symptoms worsen      Chief Complaint     Chief Complaint   Patient presents with    Earache     Bilateral ear pain x 2 weeks, sore throat (painful to swallow), bilateral lateral neck soreness, intermittent HA  January 19 Covid positive frontal pounding HA intermittent since  Pt also reports loss of taste since January which has not returned  Pt taking ibuprofen, last dose @ 7am this am  Pt sob, cough, n/v/d, fever, chills  E/d well  History of Present Illness         Patient presents for evaluation of bilateral ear pain and sore throat  She states that over the past year the symptoms have become quite chronic  She has been treated a couple of times for strep throat but has never had a throat swab for culture done  She was treated with Augmentin on the 5th of February  She states that the antibiotics typically make her symptoms resolve in a couple of days but as soon as she comes off of them she starts developing the ear and throat pain again  She saw an ENT about a year ago and at that time it was thought that her symptoms were related to allergies  Patient was COVID positive in January  She states most of her symptoms have resolved except for the loss of taste and smell and occasional headaches  She does not have a cough or shortness of breath  She has not had any fevers, chills or body aches  She states it is very painful to swallow  Patient took ibuprofen this morning  She states that after she had strep earlier in the month she switched out her toothbrush  Review of Systems   Review of Systems   Constitutional: Negative for chills and fever  HENT: Positive for ear pain and sore throat  Respiratory: Negative  Cardiovascular: Negative  Musculoskeletal: Negative  Skin: Negative  Neurological: Positive for headaches           Current Medications       Current Outpatient Medications:     fluticasone (FLONASE) 50 mcg/act nasal spray, 1 spray into each nostril daily, Disp: 1 Bottle, Rfl: 2    LIEN FE 1 5/30 1 5-30 MG-MCG tablet, Take 1 tablet by mouth daily, Disp: 84 tablet, Rfl: 3    Multiple Vitamins-Minerals (ONE-A-DAY WOMENS VITACRAVES) CHEW, , Disp: , Rfl:     ondansetron (ZOFRAN) 4 mg tablet, Take 1 tablet (4 mg total) by mouth every 8 (eight) hours as needed for nausea or vomiting (Patient not taking: Reported on 2/5/2021), Disp: 20 tablet, Rfl: 0    predniSONE 20 mg tablet, Take 1 tablet (20 mg total) by mouth 2 (two) times a day with meals for 5 days, Disp: 10 tablet, Rfl: 0    Current Allergies     Allergies as of 02/21/2021    (No Known Allergies)            The following portions of the patient's history were reviewed and updated as appropriate: allergies, current medications, past family history, past medical history, past social history, past surgical history and problem list      Past Medical History:   Diagnosis Date    Asthma     COVID-19     Fatigue Extreme fatigue for the last year    Iron deficiency     Migraine     Nasal congestion Sinus infection last week of January    Frequent sinus infections    Nosebleed Since 10years old    Frequent nosebleeds    Obesity Weight gain over the past few years    Appointment scheduled for weight loss center    Otitis media Last ear infection was the end of January    Three ear infections in the past 6 months    Sleep difficulties Last 6 months    Scheduled for a sleep study in April       Past Surgical History:   Procedure Laterality Date    WISDOM TOOTH EXTRACTION         Family History   Problem Relation Age of Onset    Hypertension Mother     Migraines Mother         Frequent Migraines    Stomach cancer Father     Cancer Father         Stomach Cancer    Ovarian cancer Paternal Grandmother     Cancer Paternal Grandmother         Ovarian Cancer    Prostate cancer Paternal Grandfather     Skin cancer Paternal Grandfather     Hypertension Paternal Grandfather     Hypertension Maternal Grandfather     Cancer Maternal Grandfather         prostate and skin     Migraines Sister         Frequent Migraines    Diabetes Neg Hx  Heart disease Neg Hx     Stroke Neg Hx     Thyroid disease Neg Hx          Medications have been verified  Objective   Pulse 103   Temp (!) 97 2 °F (36 2 °C)   Resp 16   SpO2 97%        Physical Exam     Physical Exam  Vitals signs reviewed  Constitutional:       General: She is not in acute distress  HENT:      Right Ear: A middle ear effusion is present  Tympanic membrane is not erythematous  Left Ear: A middle ear effusion is present  Tympanic membrane is not erythematous  Mouth/Throat:      Mouth: Mucous membranes are moist       Pharynx: Pharyngeal swelling and posterior oropharyngeal erythema present  No uvula swelling  Tonsils: Tonsillar exudate present  2+ on the right  1+ on the left  Cardiovascular:      Rate and Rhythm: Normal rate  Pulmonary:      Effort: Pulmonary effort is normal       Breath sounds: Normal breath sounds  Lymphadenopathy:      Cervical: Cervical adenopathy present  Skin:     General: Skin is warm and dry  Neurological:      Mental Status: She is alert

## 2022-08-19 ENCOUNTER — TELEPHONE (OUTPATIENT)
Dept: PRIMARY CARE CLINIC | Age: 72
End: 2022-08-19

## 2022-08-19 DIAGNOSIS — I10 ESSENTIAL HYPERTENSION: ICD-10-CM

## 2022-08-19 RX ORDER — OLMESARTAN MEDOXOMIL AND HYDROCHLOROTHIAZIDE 20/12.5 20; 12.5 MG/1; MG/1
1 TABLET ORAL DAILY
Qty: 30 TABLET | Refills: 0 | Status: CANCELLED | OUTPATIENT
Start: 2022-08-19

## 2022-08-19 RX ORDER — OLMESARTAN MEDOXOMIL AND HYDROCHLOROTHIAZIDE 20/12.5 20; 12.5 MG/1; MG/1
1 TABLET ORAL DAILY
Qty: 90 TABLET | Refills: 3 | Status: SHIPPED | OUTPATIENT
Start: 2022-08-19

## 2022-08-19 NOTE — TELEPHONE ENCOUNTER
----- Message from Warren Harper sent at 8/19/2022  8:26 AM EDT -----  Subject: Medication Problem    Medication: olmesartan-hydroCHLOROthiazide (BENICAR HCT) 20-12.5 mg per   tablet  Dosage: once a day  Ordering Provider: ulysses    Question/Problem: Pt stated that she came in last month and provided the   escript # for her med to be sent to the [facility] [email or web address] mail order pharmacy and   nothing has been sent. Pt is down to two pills and would now would like it   sent to [facility] instead.   Additional Information for Provider: Pls give pt a call    Pharmacy: [address]    ---------------------------------------------------------------------------  --------------  [address]  [phone]; OK to leave message on voicemail  ---------------------------------------------------------------------------  --------------    SCRIPT ANSWERS  Relationship to Patient: Self

## 2022-11-08 ENCOUNTER — PATIENT MESSAGE (OUTPATIENT)
Dept: PRIMARY CARE CLINIC | Age: 72
End: 2022-11-08

## 2022-11-09 ENCOUNTER — TELEPHONE (OUTPATIENT)
Dept: PRIMARY CARE CLINIC | Age: 72
End: 2022-11-09

## 2022-11-09 NOTE — TELEPHONE ENCOUNTER
----- Message from Anthony Honeycutt sent at 11/9/2022 11:17 AM EST -----  Subject: Message to Provider    QUESTIONS  Information for Provider? Patient would like paperwork that is at the   , mailed to her. Patient is sick with a cold.  ---------------------------------------------------------------------------  --------------  Jb MOSS  [phone]; OK to leave message on voicemail  ---------------------------------------------------------------------------  --------------  SCRIPT ANSWERS  Relationship to Patient?  Self

## 2023-01-12 DIAGNOSIS — I10 ESSENTIAL HYPERTENSION: ICD-10-CM

## 2023-01-20 NOTE — TELEPHONE ENCOUNTER
Requested Prescriptions     Pending Prescriptions Disp Refills    olmesartan-hydroCHLOROthiazide (BENICAR HCT) 20-12.5 mg per tablet 90 Tablet 3     Sig: Take 1 Tablet by mouth daily.         Last Visit 07/11/2022  Last Refill 08/19/2022

## 2023-01-23 RX ORDER — OLMESARTAN MEDOXOMIL AND HYDROCHLOROTHIAZIDE 20/12.5 20; 12.5 MG/1; MG/1
1 TABLET ORAL DAILY
Qty: 90 TABLET | Refills: 1 | Status: SHIPPED | OUTPATIENT
Start: 2023-01-23

## 2023-06-16 RX ORDER — OLMESARTAN MEDOXOMIL AND HYDROCHLOROTHIAZIDE 20/12.5 20; 12.5 MG/1; MG/1
TABLET ORAL
Qty: 90 TABLET | OUTPATIENT
Start: 2023-06-16

## 2023-06-19 ENCOUNTER — TELEPHONE (OUTPATIENT)
Dept: PRIMARY CARE CLINIC | Facility: CLINIC | Age: 73
End: 2023-06-19

## 2023-06-19 DIAGNOSIS — Z12.31 ENCOUNTER FOR SCREENING MAMMOGRAM FOR MALIGNANT NEOPLASM OF BREAST: Primary | ICD-10-CM

## 2023-06-19 NOTE — TELEPHONE ENCOUNTER
Rescheduled Medicare Wellness for July 13th at 11:15, Patient is wanting to know if you can order the Mammogram so we can try to get her scheduled for the same day.  She will be going out of town startin July 15th through August.

## 2023-08-09 ENCOUNTER — TELEPHONE (OUTPATIENT)
Dept: PRIMARY CARE CLINIC | Facility: CLINIC | Age: 73
End: 2023-08-09

## 2023-08-09 NOTE — TELEPHONE ENCOUNTER
----- Message from Maxwell Moore sent at 8/8/2023  1:40 PM EDT -----  Subject: Message to Provider    QUESTIONS  Information for Provider? Patient has to care for 3 yr old. Can she bring   the well behaved child with her to her appt on 08/16 for AWV. Please call   patient to advise.  ---------------------------------------------------------------------------  --------------  [facility] INFO  [phone]; OK to leave message on voicemail  ---------------------------------------------------------------------------  --------------  SCRIPT ANSWERS  Relationship to Patient?  Self

## 2023-08-15 ENCOUNTER — OFFICE VISIT (OUTPATIENT)
Dept: PRIMARY CARE CLINIC | Facility: CLINIC | Age: 73
End: 2023-08-15

## 2023-08-15 VITALS
SYSTOLIC BLOOD PRESSURE: 131 MMHG | BODY MASS INDEX: 25.62 KG/M2 | HEIGHT: 67 IN | DIASTOLIC BLOOD PRESSURE: 75 MMHG | TEMPERATURE: 97.7 F | OXYGEN SATURATION: 100 % | HEART RATE: 63 BPM | WEIGHT: 163.2 LBS | RESPIRATION RATE: 16 BRPM

## 2023-08-15 DIAGNOSIS — Z13.220 ENCOUNTER FOR LIPID SCREENING FOR CARDIOVASCULAR DISEASE: ICD-10-CM

## 2023-08-15 DIAGNOSIS — R73.03 PREDIABETES: ICD-10-CM

## 2023-08-15 DIAGNOSIS — I10 PRIMARY HYPERTENSION: Primary | ICD-10-CM

## 2023-08-15 DIAGNOSIS — Z13.6 ENCOUNTER FOR LIPID SCREENING FOR CARDIOVASCULAR DISEASE: ICD-10-CM

## 2023-08-15 DIAGNOSIS — I10 PRIMARY HYPERTENSION: ICD-10-CM

## 2023-08-15 DIAGNOSIS — Z00.00 MEDICARE ANNUAL WELLNESS VISIT, SUBSEQUENT: ICD-10-CM

## 2023-08-15 LAB
ALBUMIN SERPL-MCNC: 3.4 G/DL (ref 3.5–5)
ALBUMIN/GLOB SERPL: 1.1 (ref 1.1–2.2)
ALP SERPL-CCNC: 41 U/L (ref 45–117)
ALT SERPL-CCNC: 19 U/L (ref 12–78)
ANION GAP SERPL CALC-SCNC: 5 MMOL/L (ref 5–15)
AST SERPL-CCNC: 14 U/L (ref 15–37)
BILIRUB SERPL-MCNC: 0.5 MG/DL (ref 0.2–1)
BUN SERPL-MCNC: 16 MG/DL (ref 6–20)
BUN/CREAT SERPL: 19 (ref 12–20)
CALCIUM SERPL-MCNC: 9 MG/DL (ref 8.5–10.1)
CHLORIDE SERPL-SCNC: 107 MMOL/L (ref 97–108)
CHOLEST SERPL-MCNC: 206 MG/DL
CO2 SERPL-SCNC: 28 MMOL/L (ref 21–32)
CREAT SERPL-MCNC: 0.85 MG/DL (ref 0.55–1.02)
ERYTHROCYTE [DISTWIDTH] IN BLOOD BY AUTOMATED COUNT: 13.3 % (ref 11.5–14.5)
EST. AVERAGE GLUCOSE BLD GHB EST-MCNC: 108 MG/DL
GLOBULIN SER CALC-MCNC: 3.2 G/DL (ref 2–4)
GLUCOSE SERPL-MCNC: 90 MG/DL (ref 65–100)
HBA1C MFR BLD: 5.4 % (ref 4–5.6)
HCT VFR BLD AUTO: 38.4 % (ref 35–47)
HDLC SERPL-MCNC: 66 MG/DL
HDLC SERPL: 3.1 (ref 0–5)
HGB BLD-MCNC: 12.2 G/DL (ref 11.5–16)
LDLC SERPL CALC-MCNC: 127.8 MG/DL (ref 0–100)
MCH RBC QN AUTO: 29.3 PG (ref 26–34)
MCHC RBC AUTO-ENTMCNC: 31.8 G/DL (ref 30–36.5)
MCV RBC AUTO: 92.1 FL (ref 80–99)
NRBC # BLD: 0 K/UL (ref 0–0.01)
NRBC BLD-RTO: 0 PER 100 WBC
PLATELET # BLD AUTO: 206 K/UL (ref 150–400)
PMV BLD AUTO: 11 FL (ref 8.9–12.9)
POTASSIUM SERPL-SCNC: 4.2 MMOL/L (ref 3.5–5.1)
PROT SERPL-MCNC: 6.6 G/DL (ref 6.4–8.2)
RBC # BLD AUTO: 4.17 M/UL (ref 3.8–5.2)
SODIUM SERPL-SCNC: 140 MMOL/L (ref 136–145)
TRIGL SERPL-MCNC: 61 MG/DL
VLDLC SERPL CALC-MCNC: 12.2 MG/DL
WBC # BLD AUTO: 4.4 K/UL (ref 3.6–11)

## 2023-08-15 RX ORDER — OLMESARTAN MEDOXOMIL AND HYDROCHLOROTHIAZIDE 20/12.5 20; 12.5 MG/1; MG/1
1 TABLET ORAL DAILY
Qty: 90 TABLET | Refills: 3 | Status: SHIPPED | OUTPATIENT
Start: 2023-08-15

## 2023-08-15 ASSESSMENT — PATIENT HEALTH QUESTIONNAIRE - PHQ9
SUM OF ALL RESPONSES TO PHQ QUESTIONS 1-9: 0
SUM OF ALL RESPONSES TO PHQ9 QUESTIONS 1 & 2: 0
2. FEELING DOWN, DEPRESSED OR HOPELESS: 0
SUM OF ALL RESPONSES TO PHQ QUESTIONS 1-9: 0
1. LITTLE INTEREST OR PLEASURE IN DOING THINGS: 0
SUM OF ALL RESPONSES TO PHQ QUESTIONS 1-9: 0
SUM OF ALL RESPONSES TO PHQ QUESTIONS 1-9: 0

## 2023-08-15 ASSESSMENT — LIFESTYLE VARIABLES
HOW OFTEN DO YOU HAVE A DRINK CONTAINING ALCOHOL: NEVER
HOW MANY STANDARD DRINKS CONTAINING ALCOHOL DO YOU HAVE ON A TYPICAL DAY: PATIENT DOES NOT DRINK

## 2023-09-15 ENCOUNTER — HOSPITAL ENCOUNTER (OUTPATIENT)
Facility: HOSPITAL | Age: 73
End: 2023-09-15
Payer: MEDICARE

## 2023-09-15 DIAGNOSIS — Z12.31 ENCOUNTER FOR SCREENING MAMMOGRAM FOR MALIGNANT NEOPLASM OF BREAST: ICD-10-CM

## 2023-09-15 PROCEDURE — 77063 BREAST TOMOSYNTHESIS BI: CPT

## 2023-10-13 ENCOUNTER — TELEPHONE (OUTPATIENT)
Dept: PRIMARY CARE CLINIC | Facility: CLINIC | Age: 73
End: 2023-10-13

## 2023-10-13 NOTE — TELEPHONE ENCOUNTER
----- Message from Naman Teran sent at 10/13/2023  1:23 PM EDT -----  Subject: Message to Provider    QUESTIONS  Information for Provider? Pt returning office called. Informed Pt why   office was calling. Pt states she has not had time to call and will call   to schedule the additional imaging when she is able.  ---------------------------------------------------------------------------  --------------  [facility] INFO  [phone]; OK to leave message on voicemail  ---------------------------------------------------------------------------  --------------  SCRIPT ANSWERS  Relationship to Patient?  Self

## 2023-10-13 NOTE — TELEPHONE ENCOUNTER
Called patient no answer left vm to call office back. Was calling to let patient know that Mammography reached out to us to let us know that they have been trying to  schedule patient for additional imaging however they have not been able to reach her. Per result note from Dr. Shi Media - Your mammogram showed an area in the R breast they would like to take additional pictures of. Someone should be reaching out to you to schedule this.      Wanted to let patient know to please call scheduling at [phone]

## 2023-10-13 NOTE — TELEPHONE ENCOUNTER
Technician from Mammography wanted Beronica Perez to know the patient has not scheduled for her scan of the right breast.  She has called and left messages but has not reached her.

## 2023-10-16 ENCOUNTER — TRANSCRIBE ORDERS (OUTPATIENT)
Facility: HOSPITAL | Age: 73
End: 2023-10-16

## 2023-10-16 DIAGNOSIS — R92.8 ABNORMAL MAMMOGRAM OF RIGHT BREAST: Primary | ICD-10-CM

## 2023-11-10 ENCOUNTER — HOSPITAL ENCOUNTER (OUTPATIENT)
Facility: HOSPITAL | Age: 73
End: 2023-11-10
Payer: MEDICARE

## 2023-11-10 VITALS — WEIGHT: 157 LBS | HEIGHT: 66 IN | BODY MASS INDEX: 25.23 KG/M2

## 2023-11-10 DIAGNOSIS — R92.8 ABNORMAL MAMMOGRAM OF RIGHT BREAST: ICD-10-CM

## 2023-11-10 PROCEDURE — G0279 TOMOSYNTHESIS, MAMMO: HCPCS

## 2023-11-10 PROCEDURE — 76642 ULTRASOUND BREAST LIMITED: CPT

## 2023-11-22 ENCOUNTER — HOSPITAL ENCOUNTER (OUTPATIENT)
Facility: HOSPITAL | Age: 73
Discharge: HOME | End: 2023-11-25
Payer: MEDICARE

## 2023-11-22 DIAGNOSIS — R92.8 ABNORMAL MAMMOGRAM OF RIGHT BREAST: ICD-10-CM

## 2023-11-22 PROCEDURE — 2500000003 HC RX 250 WO HCPCS: Performed by: RADIOLOGY

## 2023-11-22 PROCEDURE — 19083 BX BREAST 1ST LESION US IMAG: CPT

## 2023-11-22 PROCEDURE — 88305 TISSUE EXAM BY PATHOLOGIST: CPT

## 2023-11-22 PROCEDURE — 77065 DX MAMMO INCL CAD UNI: CPT

## 2023-11-22 RX ORDER — LIDOCAINE HYDROCHLORIDE AND EPINEPHRINE 10; 10 MG/ML; UG/ML
10 INJECTION, SOLUTION INFILTRATION; PERINEURAL ONCE
Status: COMPLETED | OUTPATIENT
Start: 2023-11-22 | End: 2023-11-22

## 2023-11-22 RX ORDER — LIDOCAINE HYDROCHLORIDE 10 MG/ML
15 INJECTION, SOLUTION INFILTRATION; PERINEURAL ONCE
Status: COMPLETED | OUTPATIENT
Start: 2023-11-22 | End: 2023-11-22

## 2023-11-22 RX ADMIN — LIDOCAINE HYDROCHLORIDE,EPINEPHRINE BITARTRATE 10 ML: 10; .01 INJECTION, SOLUTION INFILTRATION; PERINEURAL at 09:15

## 2023-11-22 RX ADMIN — LIDOCAINE HYDROCHLORIDE 15 ML: 10 INJECTION, SOLUTION INFILTRATION; PERINEURAL at 09:15

## 2023-11-24 NOTE — PROGRESS NOTES
Pathology from Right Breast biopsy reviewed by Dr. Scott Postal. Patient given benign results and recommended to have yearly mammogram next year. No problems with biopsy site.

## 2023-12-07 ENCOUNTER — TELEPHONE (OUTPATIENT)
Dept: PRIMARY CARE CLINIC | Facility: CLINIC | Age: 73
End: 2023-12-07

## 2023-12-07 NOTE — TELEPHONE ENCOUNTER
----- Message from March Mo, MA sent at 12/7/2023 10:58 AM EST -----  Subject: Message to Provider    QUESTIONS  Information for Provider? Pt called back, she did get biopsy results it   was normal  ---------------------------------------------------------------------------  --------------  Cassandra POOLE  [phone]; OK to leave message on voicemail  ---------------------------------------------------------------------------  --------------  SCRIPT ANSWERS  Relationship to Patient?  Self

## 2023-12-07 NOTE — TELEPHONE ENCOUNTER
Called patient no answer left vm to call office back. Per Dr. Aurora Oreilly - Can we call and confirm patient has heard the results of her breast biopsy?

## 2024-04-01 ENCOUNTER — TELEPHONE (OUTPATIENT)
Dept: PRIMARY CARE CLINIC | Facility: CLINIC | Age: 74
End: 2024-04-01

## 2024-04-01 NOTE — TELEPHONE ENCOUNTER
Left message for patient, returning her call.   She needs an appointment on April 9th.  Luzmaria is the only dr available on that day.

## 2024-04-26 ENCOUNTER — TELEPHONE (OUTPATIENT)
Age: 74
End: 2024-04-26

## 2024-04-26 NOTE — TELEPHONE ENCOUNTER
Called and lvm for patient about canceling appt for 5/3/24 for new patient appt. Also sent [employer] message.

## 2024-05-09 SDOH — ECONOMIC STABILITY: HOUSING INSECURITY
IN THE LAST 12 MONTHS, WAS THERE A TIME WHEN YOU DID NOT HAVE A STEADY PLACE TO SLEEP OR SLEPT IN A SHELTER (INCLUDING NOW)?: NO

## 2024-05-09 SDOH — ECONOMIC STABILITY: FOOD INSECURITY: WITHIN THE PAST 12 MONTHS, YOU WORRIED THAT YOUR FOOD WOULD RUN OUT BEFORE YOU GOT MONEY TO BUY MORE.: NEVER TRUE

## 2024-05-09 SDOH — ECONOMIC STABILITY: TRANSPORTATION INSECURITY
IN THE PAST 12 MONTHS, HAS LACK OF TRANSPORTATION KEPT YOU FROM MEETINGS, WORK, OR FROM GETTING THINGS NEEDED FOR DAILY LIVING?: NO

## 2024-05-09 SDOH — ECONOMIC STABILITY: INCOME INSECURITY: HOW HARD IS IT FOR YOU TO PAY FOR THE VERY BASICS LIKE FOOD, HOUSING, MEDICAL CARE, AND HEATING?: NOT VERY HARD

## 2024-05-09 SDOH — ECONOMIC STABILITY: FOOD INSECURITY: WITHIN THE PAST 12 MONTHS, THE FOOD YOU BOUGHT JUST DIDN'T LAST AND YOU DIDN'T HAVE MONEY TO GET MORE.: NEVER TRUE

## 2024-05-10 ENCOUNTER — HOSPITAL ENCOUNTER (OUTPATIENT)
Facility: HOSPITAL | Age: 74
End: 2024-05-10
Attending: FAMILY MEDICINE
Payer: MEDICARE

## 2024-05-10 ENCOUNTER — OFFICE VISIT (OUTPATIENT)
Dept: PRIMARY CARE CLINIC | Facility: CLINIC | Age: 74
End: 2024-05-10
Payer: MEDICARE

## 2024-05-10 VITALS
TEMPERATURE: 97.1 F | HEIGHT: 67 IN | WEIGHT: 158 LBS | RESPIRATION RATE: 16 BRPM | HEART RATE: 57 BPM | OXYGEN SATURATION: 100 % | SYSTOLIC BLOOD PRESSURE: 138 MMHG | BODY MASS INDEX: 24.8 KG/M2 | DIASTOLIC BLOOD PRESSURE: 81 MMHG

## 2024-05-10 DIAGNOSIS — R93.89 ABNORMAL CHEST X-RAY: ICD-10-CM

## 2024-05-10 DIAGNOSIS — R73.03 PREDIABETES: ICD-10-CM

## 2024-05-10 DIAGNOSIS — I10 PRIMARY HYPERTENSION: ICD-10-CM

## 2024-05-10 DIAGNOSIS — D72.819 LEUKOPENIA, UNSPECIFIED TYPE: ICD-10-CM

## 2024-05-10 DIAGNOSIS — Z12.11 ENCOUNTER FOR SCREENING COLONOSCOPY: Primary | ICD-10-CM

## 2024-05-10 LAB
ALBUMIN SERPL-MCNC: 3.5 G/DL (ref 3.5–5)
ALBUMIN/GLOB SERPL: 1.1 (ref 1.1–2.2)
ALP SERPL-CCNC: 40 U/L (ref 45–117)
ALT SERPL-CCNC: 16 U/L (ref 12–78)
ANION GAP SERPL CALC-SCNC: 1 MMOL/L (ref 5–15)
AST SERPL-CCNC: 15 U/L (ref 15–37)
BILIRUB SERPL-MCNC: 0.5 MG/DL (ref 0.2–1)
BUN SERPL-MCNC: 19 MG/DL (ref 6–20)
BUN/CREAT SERPL: 23 (ref 12–20)
CALCIUM SERPL-MCNC: 9.1 MG/DL (ref 8.5–10.1)
CHLORIDE SERPL-SCNC: 106 MMOL/L (ref 97–108)
CHOLEST SERPL-MCNC: 175 MG/DL
CO2 SERPL-SCNC: 32 MMOL/L (ref 21–32)
CREAT SERPL-MCNC: 0.82 MG/DL (ref 0.55–1.02)
ERYTHROCYTE [DISTWIDTH] IN BLOOD BY AUTOMATED COUNT: 13.6 % (ref 11.5–14.5)
GLOBULIN SER CALC-MCNC: 3.1 G/DL (ref 2–4)
GLUCOSE SERPL-MCNC: 92 MG/DL (ref 65–100)
HCT VFR BLD AUTO: 37.7 % (ref 35–47)
HDLC SERPL-MCNC: 65 MG/DL
HDLC SERPL: 2.7 (ref 0–5)
HGB BLD-MCNC: 11.9 G/DL (ref 11.5–16)
LDLC SERPL CALC-MCNC: 97.4 MG/DL (ref 0–100)
MCH RBC QN AUTO: 28.8 PG (ref 26–34)
MCHC RBC AUTO-ENTMCNC: 31.6 G/DL (ref 30–36.5)
MCV RBC AUTO: 91.3 FL (ref 80–99)
NRBC # BLD: 0 K/UL (ref 0–0.01)
NRBC BLD-RTO: 0 PER 100 WBC
PLATELET # BLD AUTO: 218 K/UL (ref 150–400)
PMV BLD AUTO: 11.3 FL (ref 8.9–12.9)
POTASSIUM SERPL-SCNC: 3.8 MMOL/L (ref 3.5–5.1)
PROT SERPL-MCNC: 6.6 G/DL (ref 6.4–8.2)
RBC # BLD AUTO: 4.13 M/UL (ref 3.8–5.2)
SODIUM SERPL-SCNC: 139 MMOL/L (ref 136–145)
TRIGL SERPL-MCNC: 63 MG/DL
VLDLC SERPL CALC-MCNC: 12.6 MG/DL
WBC # BLD AUTO: 4.6 K/UL (ref 3.6–11)

## 2024-05-10 PROCEDURE — 71046 X-RAY EXAM CHEST 2 VIEWS: CPT

## 2024-05-10 PROCEDURE — 1123F ACP DISCUSS/DSCN MKR DOCD: CPT | Performed by: FAMILY MEDICINE

## 2024-05-10 PROCEDURE — 3075F SYST BP GE 130 - 139MM HG: CPT | Performed by: FAMILY MEDICINE

## 2024-05-10 PROCEDURE — 99214 OFFICE O/P EST MOD 30 MIN: CPT | Performed by: FAMILY MEDICINE

## 2024-05-10 PROCEDURE — 3079F DIAST BP 80-89 MM HG: CPT | Performed by: FAMILY MEDICINE

## 2024-05-10 SDOH — ECONOMIC STABILITY: FOOD INSECURITY: WITHIN THE PAST 12 MONTHS, YOU WORRIED THAT YOUR FOOD WOULD RUN OUT BEFORE YOU GOT MONEY TO BUY MORE.: NEVER TRUE

## 2024-05-10 SDOH — ECONOMIC STABILITY: FOOD INSECURITY: WITHIN THE PAST 12 MONTHS, THE FOOD YOU BOUGHT JUST DIDN'T LAST AND YOU DIDN'T HAVE MONEY TO GET MORE.: NEVER TRUE

## 2024-05-10 ASSESSMENT — PATIENT HEALTH QUESTIONNAIRE - PHQ9
SUM OF ALL RESPONSES TO PHQ QUESTIONS 1-9: 0
SUM OF ALL RESPONSES TO PHQ QUESTIONS 1-9: 0
2. FEELING DOWN, DEPRESSED OR HOPELESS: NOT AT ALL
SUM OF ALL RESPONSES TO PHQ9 QUESTIONS 1 & 2: 0
SUM OF ALL RESPONSES TO PHQ QUESTIONS 1-9: 0
SUM OF ALL RESPONSES TO PHQ QUESTIONS 1-9: 0
1. LITTLE INTEREST OR PLEASURE IN DOING THINGS: NOT AT ALL

## 2024-05-10 NOTE — PROGRESS NOTES
\"Have you been to the ER, urgent care clinic since your last visit?  Hospitalized since your last visit?\"    NO    “Have you seen or consulted any other health care providers outside of [facility] since your last visit?”    NO            Click Here for Release of Records Request   
appearance.   HENT:      Head: Normocephalic and atraumatic.   Cardiovascular:      Rate and Rhythm: Normal rate and regular rhythm.      Heart sounds: Normal heart sounds.   Pulmonary:      Effort: Pulmonary effort is normal.      Breath sounds: Normal breath sounds.   Musculoskeletal:      Right lower leg: No edema.      Left lower leg: No edema.   Skin:     General: Skin is warm and dry.   Neurological:      Mental Status: She is alert.           Assessment/Plan   Diagnosis Orders   1. Encounter for screening colonoscopy  External Referral To Gastroenterology      2. Primary hypertension  Comprehensive Metabolic Panel    Lipid Panel      3. Abnormal chest x-ray  XR CHEST (2 VIEWS)      4. Prediabetes  Comprehensive Metabolic Panel    Lipid Panel      5. Leukopenia, unspecified type  CBC        Follow up labs, cxray.         I have discussed the diagnosis with the patient and the intended plan as seen in the above orders. Patient verbalized understanding of the plan and agrees with the plan. I have discussed medication side effects and warnings with the patient as well. Informed patient to return to the office if new symptoms arise.        Tere Pappas,

## 2024-08-23 ENCOUNTER — OFFICE VISIT (OUTPATIENT)
Dept: PRIMARY CARE CLINIC | Facility: CLINIC | Age: 74
End: 2024-08-23

## 2024-08-23 VITALS
DIASTOLIC BLOOD PRESSURE: 78 MMHG | TEMPERATURE: 98.3 F | WEIGHT: 158.6 LBS | SYSTOLIC BLOOD PRESSURE: 137 MMHG | OXYGEN SATURATION: 100 % | HEIGHT: 67 IN | HEART RATE: 69 BPM | BODY MASS INDEX: 24.89 KG/M2 | RESPIRATION RATE: 14 BRPM

## 2024-08-23 DIAGNOSIS — R73.03 PREDIABETES: Primary | ICD-10-CM

## 2024-08-23 DIAGNOSIS — Z00.00 MEDICARE ANNUAL WELLNESS VISIT, SUBSEQUENT: ICD-10-CM

## 2024-08-23 DIAGNOSIS — I10 PRIMARY HYPERTENSION: ICD-10-CM

## 2024-08-23 LAB — HBA1C MFR BLD: 5.8 %

## 2024-08-23 RX ORDER — OLMESARTAN MEDOXOMIL AND HYDROCHLOROTHIAZIDE 20/12.5 20; 12.5 MG/1; MG/1
1 TABLET ORAL DAILY
Qty: 90 TABLET | Refills: 2 | Status: SHIPPED | OUTPATIENT
Start: 2024-08-23

## 2024-08-23 SDOH — ECONOMIC STABILITY: FOOD INSECURITY: WITHIN THE PAST 12 MONTHS, YOU WORRIED THAT YOUR FOOD WOULD RUN OUT BEFORE YOU GOT MONEY TO BUY MORE.: NEVER TRUE

## 2024-08-23 SDOH — ECONOMIC STABILITY: FOOD INSECURITY: WITHIN THE PAST 12 MONTHS, THE FOOD YOU BOUGHT JUST DIDN'T LAST AND YOU DIDN'T HAVE MONEY TO GET MORE.: NEVER TRUE

## 2024-08-23 SDOH — ECONOMIC STABILITY: INCOME INSECURITY: HOW HARD IS IT FOR YOU TO PAY FOR THE VERY BASICS LIKE FOOD, HOUSING, MEDICAL CARE, AND HEATING?: NOT HARD AT ALL

## 2024-08-23 ASSESSMENT — PATIENT HEALTH QUESTIONNAIRE - PHQ9
2. FEELING DOWN, DEPRESSED OR HOPELESS: NOT AT ALL
SUM OF ALL RESPONSES TO PHQ QUESTIONS 1-9: 0
SUM OF ALL RESPONSES TO PHQ QUESTIONS 1-9: 0
SUM OF ALL RESPONSES TO PHQ9 QUESTIONS 1 & 2: 0
SUM OF ALL RESPONSES TO PHQ QUESTIONS 1-9: 0
1. LITTLE INTEREST OR PLEASURE IN DOING THINGS: NOT AT ALL
SUM OF ALL RESPONSES TO PHQ QUESTIONS 1-9: 0

## 2024-08-23 ASSESSMENT — LIFESTYLE VARIABLES
HOW MANY STANDARD DRINKS CONTAINING ALCOHOL DO YOU HAVE ON A TYPICAL DAY: PATIENT DOES NOT DRINK
HOW OFTEN DO YOU HAVE A DRINK CONTAINING ALCOHOL: NEVER

## 2024-08-23 NOTE — PROGRESS NOTES
/78 (Site: Right Upper Arm, Position: Sitting, Cuff Size: Medium Adult)   Pulse 69   Temp 98.3 °F (36.8 °C) (Oral)   Resp 14   Ht 1.689 m (5' 6.5\")   Wt 71.9 kg (158 lb 9.6 oz)   SpO2 100%   BMI 25.22 kg/m²      \"Have you been to the ER, urgent care clinic since your last visit?  Hospitalized since your last visit?\"    NO    “Have you seen or consulted any other health care providers outside of [facility] since your last visit?”    NO        “Have you had a colorectal cancer screening such as a colonoscopy/FIT/Cologuard?    YES - Type: Colonoscopy - Where: Colonoscopy done on 8/2/2024. Lucila Chaidez Nurse/CHAPARRO to request most recent records if not in the chart     Date of last Colonoscopy: 7/29/2019  No cologuard on file  No FIT/FOBT on file   No flexible sigmoidoscopy on file         Click Here for Release of Records Request

## 2024-08-23 NOTE — PROGRESS NOTES
Medicare Annual Wellness Visit    Manuela Ramirez is here for Medicare AWV (No new concerns)    1. Primary hypertension  BP well controlled. Recent CBC and CMP in May 2024 okay. Continue Benicar. Refilled.   - olmesartan-hydroCHLOROthiazide (BENICAR HCT) 20-12.5 MG per tablet; Take 1 tablet by mouth daily  Dispense: 90 tablet; Refill: 2    2. Prediabetes  POC A1c 5.8 in prediabetic range. Work on dietary control.   - AMB POC HEMOGLOBIN A1C    3. Medicare annual wellness visit, subsequent       Recommendations for Preventive Services Due: see orders and patient instructions/AVS.  Recommended screening schedule for the next 5-10 years is provided to the patient in written form: see Patient Instructions/AVS.     No follow-ups on file.     Subjective     HTN - Currently on Olmesartan-HCTZ. Well controlled. No chest pain, headaches, blurred vision or shortness of breath.   Prediabetes - Last 2 A1c within normal range.     Patient's complete Health Risk Assessment and screening values have been reviewed and are found in Flowsheets. The following problems were reviewed today and where indicated follow up appointments were made and/or referrals ordered.    - RSV Vaccine, discussed  - COVID Vaccine, discussed  - DEXA scan 2022  - Colon cancer screening Aug 2, 2024 at [place] all normal, was told she didn't need to come back  - Flu vaccine, discussed   - Mammogram due Nov 2024, not sure if she wants to continue this  - Tdap 2019    No Positive Risk Factors identified today.                         Vision Screen:  Do you have difficulty driving, watching TV, or doing any of your daily activities because of your eyesight?: No  Have you had an eye exam within the past year?: (!) No              Objective   Vitals:    08/23/24 0818   BP: 137/78   Site: Right Upper Arm   Position: Sitting   Cuff Size: Medium Adult   Pulse: 69   Resp: 14   Temp: 98.3 °F (36.8 °C)   TempSrc: Oral   SpO2: 100%   Weight: 71.9 kg (158 lb 9.6 oz)

## 2025-06-30 DIAGNOSIS — I10 PRIMARY HYPERTENSION: ICD-10-CM

## 2025-07-02 RX ORDER — OLMESARTAN MEDOXOMIL AND HYDROCHLOROTHIAZIDE 20/12.5 20; 12.5 MG/1; MG/1
1 TABLET ORAL DAILY
Qty: 30 TABLET | Refills: 0 | Status: SHIPPED | OUTPATIENT
Start: 2025-07-02

## 2025-08-05 DIAGNOSIS — I10 PRIMARY HYPERTENSION: ICD-10-CM

## 2025-08-05 RX ORDER — OLMESARTAN MEDOXOMIL AND HYDROCHLOROTHIAZIDE 20/12.5 20; 12.5 MG/1; MG/1
1 TABLET ORAL DAILY
Qty: 90 TABLET | Refills: 1 | OUTPATIENT
Start: 2025-08-05